# Patient Record
Sex: FEMALE | Race: WHITE | NOT HISPANIC OR LATINO
[De-identification: names, ages, dates, MRNs, and addresses within clinical notes are randomized per-mention and may not be internally consistent; named-entity substitution may affect disease eponyms.]

---

## 2017-01-11 ENCOUNTER — RX RENEWAL (OUTPATIENT)
Age: 57
End: 2017-01-11

## 2017-08-28 ENCOUNTER — TRANSCRIPTION ENCOUNTER (OUTPATIENT)
Age: 57
End: 2017-08-28

## 2017-09-08 ENCOUNTER — OTHER (OUTPATIENT)
Age: 57
End: 2017-09-08

## 2017-09-10 ENCOUNTER — TRANSCRIPTION ENCOUNTER (OUTPATIENT)
Age: 57
End: 2017-09-10

## 2017-09-21 ENCOUNTER — TRANSCRIPTION ENCOUNTER (OUTPATIENT)
Age: 57
End: 2017-09-21

## 2017-11-28 ENCOUNTER — APPOINTMENT (OUTPATIENT)
Dept: INTERNAL MEDICINE | Facility: CLINIC | Age: 57
End: 2017-11-28
Payer: COMMERCIAL

## 2017-11-28 ENCOUNTER — MED ADMIN CHARGE (OUTPATIENT)
Age: 57
End: 2017-11-28

## 2017-11-28 PROCEDURE — 90686 IIV4 VACC NO PRSV 0.5 ML IM: CPT

## 2017-11-28 PROCEDURE — G0008: CPT

## 2017-12-06 ENCOUNTER — APPOINTMENT (OUTPATIENT)
Dept: DERMATOLOGY | Facility: CLINIC | Age: 57
End: 2017-12-06
Payer: COMMERCIAL

## 2017-12-06 PROCEDURE — 99213 OFFICE O/P EST LOW 20 MIN: CPT

## 2017-12-07 ENCOUNTER — NON-APPOINTMENT (OUTPATIENT)
Age: 57
End: 2017-12-07

## 2017-12-07 ENCOUNTER — APPOINTMENT (OUTPATIENT)
Dept: INTERNAL MEDICINE | Facility: CLINIC | Age: 57
End: 2017-12-07
Payer: COMMERCIAL

## 2017-12-07 VITALS
RESPIRATION RATE: 11 BRPM | BODY MASS INDEX: 25.21 KG/M2 | SYSTOLIC BLOOD PRESSURE: 122 MMHG | HEART RATE: 64 BPM | WEIGHT: 137 LBS | HEIGHT: 62 IN | DIASTOLIC BLOOD PRESSURE: 80 MMHG

## 2017-12-07 DIAGNOSIS — Z92.29 PERSONAL HISTORY OF OTHER DRUG THERAPY: ICD-10-CM

## 2017-12-07 DIAGNOSIS — Z15.01 GENETIC SUSCEPTIBILITY TO MALIGNANT NEOPLASM OF BREAST: ICD-10-CM

## 2017-12-07 DIAGNOSIS — Z15.02 GENETIC SUSCEPTIBILITY TO MALIGNANT NEOPLASM OF BREAST: ICD-10-CM

## 2017-12-07 PROCEDURE — 93000 ELECTROCARDIOGRAM COMPLETE: CPT

## 2017-12-07 PROCEDURE — 36415 COLL VENOUS BLD VENIPUNCTURE: CPT

## 2017-12-07 PROCEDURE — 99396 PREV VISIT EST AGE 40-64: CPT | Mod: 25

## 2017-12-08 ENCOUNTER — RESULT REVIEW (OUTPATIENT)
Age: 57
End: 2017-12-08

## 2017-12-08 LAB
ALBUMIN SERPL ELPH-MCNC: 4.3 G/DL
ALP BLD-CCNC: 59 U/L
ALT SERPL-CCNC: 25 U/L
ANION GAP SERPL CALC-SCNC: 15 MMOL/L
APPEARANCE: CLEAR
AST SERPL-CCNC: 30 U/L
BACTERIA: NEGATIVE
BASOPHILS # BLD AUTO: 0.03 K/UL
BASOPHILS NFR BLD AUTO: 0.8 %
BILIRUB SERPL-MCNC: 0.3 MG/DL
BILIRUBIN URINE: NEGATIVE
BLOOD URINE: NEGATIVE
BUN SERPL-MCNC: 14 MG/DL
CALCIUM SERPL-MCNC: 9.6 MG/DL
CHLORIDE SERPL-SCNC: 103 MMOL/L
CHOLEST SERPL-MCNC: 236 MG/DL
CHOLEST/HDLC SERPL: 2.9 RATIO
CO2 SERPL-SCNC: 23 MMOL/L
COLOR: YELLOW
CREAT SERPL-MCNC: 0.82 MG/DL
EOSINOPHIL # BLD AUTO: 0.2 K/UL
EOSINOPHIL NFR BLD AUTO: 5.1 %
GLUCOSE QUALITATIVE U: NEGATIVE MG/DL
GLUCOSE SERPL-MCNC: 94 MG/DL
HCT VFR BLD CALC: 38.9 %
HDLC SERPL-MCNC: 82 MG/DL
HGB BLD-MCNC: 12.9 G/DL
HYALINE CASTS: 0 /LPF
IMM GRANULOCYTES NFR BLD AUTO: 0.3 %
KETONES URINE: NEGATIVE
LDLC SERPL CALC-MCNC: 134 MG/DL
LEUKOCYTE ESTERASE URINE: ABNORMAL
LYMPHOCYTES # BLD AUTO: 1.42 K/UL
LYMPHOCYTES NFR BLD AUTO: 36.2 %
MAN DIFF?: NORMAL
MCHC RBC-ENTMCNC: 30.5 PG
MCHC RBC-ENTMCNC: 33.2 GM/DL
MCV RBC AUTO: 92 FL
MICROSCOPIC-UA: NORMAL
MONOCYTES # BLD AUTO: 0.34 K/UL
MONOCYTES NFR BLD AUTO: 8.7 %
NEUTROPHILS # BLD AUTO: 1.92 K/UL
NEUTROPHILS NFR BLD AUTO: 48.9 %
NITRITE URINE: NEGATIVE
PH URINE: 6.5
PLATELET # BLD AUTO: 197 K/UL
POTASSIUM SERPL-SCNC: 4.6 MMOL/L
PROT SERPL-MCNC: 7.4 G/DL
PROTEIN URINE: NEGATIVE MG/DL
RBC # BLD: 4.23 M/UL
RBC # FLD: 12.8 %
RED BLOOD CELLS URINE: 2 /HPF
SODIUM SERPL-SCNC: 141 MMOL/L
SPECIFIC GRAVITY URINE: 1
SQUAMOUS EPITHELIAL CELLS: 1 /HPF
TRIGL SERPL-MCNC: 98 MG/DL
UROBILINOGEN URINE: NEGATIVE MG/DL
WBC # FLD AUTO: 3.92 K/UL
WHITE BLOOD CELLS URINE: 1 /HPF

## 2018-03-29 ENCOUNTER — OUTPATIENT (OUTPATIENT)
Dept: OUTPATIENT SERVICES | Facility: HOSPITAL | Age: 58
LOS: 1 days | End: 2018-03-29
Payer: COMMERCIAL

## 2018-03-29 VITALS
HEART RATE: 60 BPM | DIASTOLIC BLOOD PRESSURE: 78 MMHG | OXYGEN SATURATION: 100 % | HEIGHT: 63 IN | RESPIRATION RATE: 16 BRPM | SYSTOLIC BLOOD PRESSURE: 143 MMHG | TEMPERATURE: 98 F | WEIGHT: 130.95 LBS

## 2018-03-29 DIAGNOSIS — N60.02 SOLITARY CYST OF LEFT BREAST: Chronic | ICD-10-CM

## 2018-03-29 DIAGNOSIS — Z90.12 ACQUIRED ABSENCE OF LEFT BREAST AND NIPPLE: Chronic | ICD-10-CM

## 2018-03-29 DIAGNOSIS — Z01.818 ENCOUNTER FOR OTHER PREPROCEDURAL EXAMINATION: ICD-10-CM

## 2018-03-29 DIAGNOSIS — M67.40 GANGLION, UNSPECIFIED SITE: ICD-10-CM

## 2018-03-29 DIAGNOSIS — Z98.890 OTHER SPECIFIED POSTPROCEDURAL STATES: Chronic | ICD-10-CM

## 2018-03-29 DIAGNOSIS — M77.32 CALCANEAL SPUR, LEFT FOOT: ICD-10-CM

## 2018-03-29 DIAGNOSIS — M77.52 OTHER ENTHESOPATHY OF LEFT FOOT AND ANKLE: ICD-10-CM

## 2018-03-29 DIAGNOSIS — C50.912 MALIGNANT NEOPLASM OF UNSPECIFIED SITE OF LEFT FEMALE BREAST: ICD-10-CM

## 2018-03-29 DIAGNOSIS — M79.2 NEURALGIA AND NEURITIS, UNSPECIFIED: ICD-10-CM

## 2018-03-29 DIAGNOSIS — M67.472 GANGLION, LEFT ANKLE AND FOOT: ICD-10-CM

## 2018-03-29 LAB
ANION GAP SERPL CALC-SCNC: 5 MMOL/L — SIGNIFICANT CHANGE UP (ref 5–17)
BUN SERPL-MCNC: 26 MG/DL — HIGH (ref 7–23)
CALCIUM SERPL-MCNC: 9.9 MG/DL — SIGNIFICANT CHANGE UP (ref 8.4–10.5)
CHLORIDE SERPL-SCNC: 102 MMOL/L — SIGNIFICANT CHANGE UP (ref 96–108)
CO2 SERPL-SCNC: 29 MMOL/L — SIGNIFICANT CHANGE UP (ref 22–31)
CREAT SERPL-MCNC: 0.77 MG/DL — SIGNIFICANT CHANGE UP (ref 0.5–1.3)
GLUCOSE SERPL-MCNC: 105 MG/DL — HIGH (ref 70–99)
HCT VFR BLD CALC: 40.4 % — SIGNIFICANT CHANGE UP (ref 34.5–45)
HGB BLD-MCNC: 14.6 G/DL — SIGNIFICANT CHANGE UP (ref 11.5–15.5)
MCHC RBC-ENTMCNC: 32.9 PG — SIGNIFICANT CHANGE UP (ref 27–34)
MCHC RBC-ENTMCNC: 36 GM/DL — SIGNIFICANT CHANGE UP (ref 32–36)
MCV RBC AUTO: 91.4 FL — SIGNIFICANT CHANGE UP (ref 80–100)
PLATELET # BLD AUTO: 191 K/UL — SIGNIFICANT CHANGE UP (ref 150–400)
POTASSIUM SERPL-MCNC: 4.1 MMOL/L — SIGNIFICANT CHANGE UP (ref 3.5–5.3)
POTASSIUM SERPL-SCNC: 4.1 MMOL/L — SIGNIFICANT CHANGE UP (ref 3.5–5.3)
RBC # BLD: 4.42 M/UL — SIGNIFICANT CHANGE UP (ref 3.8–5.2)
RBC # FLD: 11.2 % — SIGNIFICANT CHANGE UP (ref 10.3–14.5)
SODIUM SERPL-SCNC: 136 MMOL/L — SIGNIFICANT CHANGE UP (ref 135–145)
WBC # BLD: 5 K/UL — SIGNIFICANT CHANGE UP (ref 3.8–10.5)
WBC # FLD AUTO: 5 K/UL — SIGNIFICANT CHANGE UP (ref 3.8–10.5)

## 2018-03-29 PROCEDURE — 93005 ELECTROCARDIOGRAM TRACING: CPT

## 2018-03-29 PROCEDURE — 85027 COMPLETE CBC AUTOMATED: CPT

## 2018-03-29 PROCEDURE — G0463: CPT

## 2018-03-29 PROCEDURE — 93010 ELECTROCARDIOGRAM REPORT: CPT | Mod: NC

## 2018-03-29 PROCEDURE — 80048 BASIC METABOLIC PNL TOTAL CA: CPT

## 2018-03-29 NOTE — H&P PST ADULT - FAMILY HISTORY
Father  Still living? No  Family history of myocardial infarction, Age at diagnosis: Age Unknown  Family history of breast cancer in male, Age at diagnosis: Age Unknown     Mother  Still living? No  Family history of breast cancer in mother, Age at diagnosis: Age Unknown  Maternal family history of dementia, Age at diagnosis: Age Unknown  Family history of osteoporosis in mother, Age at diagnosis: Age Unknown     Sibling  Still living? Yes, Estimated age: 51-60  Family history of breast cancer in sister, Age at diagnosis: Age Unknown  Family history of breast cancer in sister, Age at diagnosis: Age Unknown

## 2018-03-29 NOTE — H&P PST ADULT - MUSCULOSKELETAL COMMENTS
left foot intermittent pain- radiates to left toes, right knee pain dorsal surface of left foot with centrally located approx 2cm annular firm (but compressible) fixed mass- non-tender on palpation

## 2018-03-29 NOTE — H&P PST ADULT - PSH
Cyst of left breast  approx 1995- excision left breast cyst  S/P colonoscopy  approx 2004  S/P mastectomy, left  2006

## 2018-03-29 NOTE — H&P PST ADULT - ASSESSMENT
56yo female patient scheduled for surgery on 4/17/18. She will obtain medical clearance from her PMD. She will be NPO as per Anesthesia and will take Pepcid @  on 4/16 and on AM of surgery with a sip of water. All other pre-op instructions reviewed with patient.

## 2018-03-29 NOTE — H&P PST ADULT - ATTENDING COMMENTS
Patient seen and examined at bedside. Denies any symptoms, complaints, no chest pain, palpitation, sob or any other complaints. Patient was cleared as out patient by her PCP Dr. Hansen . Medically stable. Scheduled to have a cyst removed from dorsal aspect of left foot.

## 2018-03-29 NOTE — H&P PST ADULT - NSANTHOSAYNRD_GEN_A_CORE
No. FIDELINA screening performed.  STOP BANG Legend: 0-2 = LOW Risk; 3-4 = INTERMEDIATE Risk; 5-8 = HIGH Risk

## 2018-03-29 NOTE — H&P PST ADULT - HISTORY OF PRESENT ILLNESS
58yo female patient with approximately a year history pain in her left foot. She states that it was worse this past winter due to wearing boots and shoes. She states that she has had the cyst drained once but it recurred. She rates that pain at 6-7/10 when it does occur, and the pain radiates to her toes. She was told that surgical excision of cyst and bone spur is recommended and she presents today for PSTs.

## 2018-03-29 NOTE — H&P PST ADULT - PMH
Bone spur of left foot    Ganglion cyst of left foot    Malignant neoplasm of left female breast, unspecified estrogen receptor status, unspecified site of breast    Motion sickness    Osteoporosis    Psoriasis

## 2018-04-12 ENCOUNTER — APPOINTMENT (OUTPATIENT)
Dept: INTERNAL MEDICINE | Facility: CLINIC | Age: 58
End: 2018-04-12
Payer: COMMERCIAL

## 2018-04-12 VITALS
SYSTOLIC BLOOD PRESSURE: 122 MMHG | RESPIRATION RATE: 14 BRPM | DIASTOLIC BLOOD PRESSURE: 80 MMHG | HEART RATE: 83 BPM | BODY MASS INDEX: 23.92 KG/M2 | HEIGHT: 62 IN | WEIGHT: 130 LBS

## 2018-04-12 PROCEDURE — 99213 OFFICE O/P EST LOW 20 MIN: CPT

## 2018-04-16 ENCOUNTER — TRANSCRIPTION ENCOUNTER (OUTPATIENT)
Age: 58
End: 2018-04-16

## 2018-04-16 RX ORDER — FAMOTIDINE 10 MG/ML
1 INJECTION INTRAVENOUS
Qty: 0 | Refills: 0 | COMMUNITY
Start: 2018-04-16 | End: 2018-04-17

## 2018-04-17 ENCOUNTER — RESULT REVIEW (OUTPATIENT)
Age: 58
End: 2018-04-17

## 2018-04-17 ENCOUNTER — OUTPATIENT (OUTPATIENT)
Dept: OUTPATIENT SERVICES | Facility: HOSPITAL | Age: 58
LOS: 1 days | End: 2018-04-17
Payer: COMMERCIAL

## 2018-04-17 VITALS
RESPIRATION RATE: 14 BRPM | SYSTOLIC BLOOD PRESSURE: 126 MMHG | HEIGHT: 63 IN | OXYGEN SATURATION: 100 % | WEIGHT: 130.29 LBS | DIASTOLIC BLOOD PRESSURE: 81 MMHG | HEART RATE: 59 BPM | TEMPERATURE: 98 F

## 2018-04-17 VITALS
OXYGEN SATURATION: 99 % | RESPIRATION RATE: 12 BRPM | DIASTOLIC BLOOD PRESSURE: 79 MMHG | SYSTOLIC BLOOD PRESSURE: 135 MMHG | HEART RATE: 67 BPM

## 2018-04-17 DIAGNOSIS — N60.02 SOLITARY CYST OF LEFT BREAST: Chronic | ICD-10-CM

## 2018-04-17 DIAGNOSIS — Z90.12 ACQUIRED ABSENCE OF LEFT BREAST AND NIPPLE: Chronic | ICD-10-CM

## 2018-04-17 DIAGNOSIS — M67.40 GANGLION, UNSPECIFIED SITE: ICD-10-CM

## 2018-04-17 DIAGNOSIS — M67.472 GANGLION, LEFT ANKLE AND FOOT: ICD-10-CM

## 2018-04-17 DIAGNOSIS — Z98.890 OTHER SPECIFIED POSTPROCEDURAL STATES: Chronic | ICD-10-CM

## 2018-04-17 DIAGNOSIS — M79.2 NEURALGIA AND NEURITIS, UNSPECIFIED: ICD-10-CM

## 2018-04-17 DIAGNOSIS — M77.32 CALCANEAL SPUR, LEFT FOOT: ICD-10-CM

## 2018-04-17 PROCEDURE — 99243 OFF/OP CNSLTJ NEW/EST LOW 30: CPT

## 2018-04-17 PROCEDURE — 73630 X-RAY EXAM OF FOOT: CPT

## 2018-04-17 PROCEDURE — 88311 DECALCIFY TISSUE: CPT | Mod: 26

## 2018-04-17 PROCEDURE — 97161 PT EVAL LOW COMPLEX 20 MIN: CPT | Mod: CI

## 2018-04-17 PROCEDURE — 88311 DECALCIFY TISSUE: CPT

## 2018-04-17 PROCEDURE — 73630 X-RAY EXAM OF FOOT: CPT | Mod: 26,LT

## 2018-04-17 PROCEDURE — 28104 REMOVAL OF FOOT LESION: CPT | Mod: LT

## 2018-04-17 PROCEDURE — 88305 TISSUE EXAM BY PATHOLOGIST: CPT | Mod: 26

## 2018-04-17 PROCEDURE — 88305 TISSUE EXAM BY PATHOLOGIST: CPT

## 2018-04-17 RX ORDER — OXYCODONE AND ACETAMINOPHEN 5; 325 MG/1; MG/1
1 TABLET ORAL ONCE
Qty: 0 | Refills: 0 | Status: DISCONTINUED | OUTPATIENT
Start: 2018-04-17 | End: 2018-04-17

## 2018-04-17 RX ORDER — SODIUM CHLORIDE 9 MG/ML
1000 INJECTION, SOLUTION INTRAVENOUS
Qty: 0 | Refills: 0 | Status: DISCONTINUED | OUTPATIENT
Start: 2018-04-17 | End: 2018-04-18

## 2018-04-17 RX ORDER — HYDROMORPHONE HYDROCHLORIDE 2 MG/ML
0.5 INJECTION INTRAMUSCULAR; INTRAVENOUS; SUBCUTANEOUS
Qty: 0 | Refills: 0 | Status: DISCONTINUED | OUTPATIENT
Start: 2018-04-17 | End: 2018-04-18

## 2018-04-17 RX ORDER — CEFAZOLIN SODIUM 1 G
2000 VIAL (EA) INJECTION ONCE
Qty: 0 | Refills: 0 | Status: COMPLETED | OUTPATIENT
Start: 2018-04-17 | End: 2018-04-17

## 2018-04-17 RX ADMIN — SODIUM CHLORIDE 100 MILLILITER(S): 9 INJECTION, SOLUTION INTRAVENOUS at 13:03

## 2018-04-17 NOTE — BRIEF OPERATIVE NOTE - POST-OP DX
Exostosis of bone of foot  04/17/2018    Rob Armenta  Ganglion cyst  04/17/2018    Rob Armenta  Neuritis of foot, left  04/17/2018    Rob Armenta

## 2018-04-17 NOTE — ASU DISCHARGE PLAN (ADULT/PEDIATRIC). - MEDICATION SUMMARY - MEDICATIONS TO TAKE
I will START or STAY ON the medications listed below when I get home from the hospital:    Pepcid AC Maximum Strength 20 mg oral tablet  -- 1 tab(s) by mouth 2 times a day(x2 preoperatively )  -- Indication: For Ganglion cyst

## 2018-04-17 NOTE — BRIEF OPERATIVE NOTE - PRE-OP DX
Exostosis of left foot  04/17/2018    Rob Armenta  Ganglion cyst  04/17/2018    Rob Armenta  Neuritis of left foot  04/17/2018    Rob Armenta

## 2018-04-17 NOTE — BRIEF OPERATIVE NOTE - PROCEDURE
<<-----Click on this checkbox to enter Procedure Neurolysis of lower extremity  04/17/2018    Active  [facility]  Exostectomy of bone of foot  04/17/2018    Active  MLIVINGSTON  Excision of ganglion cyst  04/17/2018    Active  MLIVINGSTON

## 2018-04-17 NOTE — ASU DISCHARGE PLAN (ADULT/PEDIATRIC). - NOTIFY
Numbness, tingling/Persistent Nausea and Vomiting/Increased Irritability or Sluggishness/Bleeding that does not stop/Swelling that continues/Numbness, color, or temperature change to extremity/Fever greater than 101/Excessive Diarrhea/Inability to Tolerate Liquids or Foods/Pain not relieved by Medications

## 2018-10-24 PROBLEM — T75.3XXA MOTION SICKNESS, INITIAL ENCOUNTER: Chronic | Status: ACTIVE | Noted: 2018-03-29

## 2018-10-24 PROBLEM — M67.472 GANGLION, LEFT ANKLE AND FOOT: Chronic | Status: ACTIVE | Noted: 2018-03-29

## 2018-10-24 PROBLEM — L40.9 PSORIASIS, UNSPECIFIED: Chronic | Status: ACTIVE | Noted: 2018-03-29

## 2018-10-24 PROBLEM — C50.912 MALIGNANT NEOPLASM OF UNSPECIFIED SITE OF LEFT FEMALE BREAST: Chronic | Status: ACTIVE | Noted: 2018-03-29

## 2018-10-24 PROBLEM — M77.52 OTHER ENTHESOPATHY OF LEFT FOOT AND ANKLE: Chronic | Status: ACTIVE | Noted: 2018-03-29

## 2018-10-24 PROBLEM — M81.0 AGE-RELATED OSTEOPOROSIS WITHOUT CURRENT PATHOLOGICAL FRACTURE: Chronic | Status: ACTIVE | Noted: 2018-03-29

## 2018-10-25 ENCOUNTER — APPOINTMENT (OUTPATIENT)
Dept: DERMATOLOGY | Facility: CLINIC | Age: 58
End: 2018-10-25
Payer: COMMERCIAL

## 2018-10-25 PROCEDURE — 99213 OFFICE O/P EST LOW 20 MIN: CPT | Mod: 25

## 2018-10-25 PROCEDURE — 17110 DESTRUCTION B9 LES UP TO 14: CPT

## 2018-12-06 ENCOUNTER — APPOINTMENT (OUTPATIENT)
Dept: INTERNAL MEDICINE | Facility: CLINIC | Age: 58
End: 2018-12-06

## 2018-12-13 ENCOUNTER — APPOINTMENT (OUTPATIENT)
Dept: INTERNAL MEDICINE | Facility: CLINIC | Age: 58
End: 2018-12-13
Payer: COMMERCIAL

## 2018-12-13 ENCOUNTER — NON-APPOINTMENT (OUTPATIENT)
Age: 58
End: 2018-12-13

## 2018-12-13 VITALS
BODY MASS INDEX: 25.76 KG/M2 | RESPIRATION RATE: 14 BRPM | WEIGHT: 140 LBS | HEART RATE: 72 BPM | HEIGHT: 62 IN | DIASTOLIC BLOOD PRESSURE: 82 MMHG | SYSTOLIC BLOOD PRESSURE: 130 MMHG

## 2018-12-13 DIAGNOSIS — S82.044D NONDISPLACED COMMINUTED FRACTURE OF RIGHT PATELLA, SUBSEQUENT ENCOUNTER FOR CLOSED FRACTURE WITH ROUTINE HEALING: ICD-10-CM

## 2018-12-13 DIAGNOSIS — Z01.818 ENCOUNTER FOR OTHER PREPROCEDURAL EXAMINATION: ICD-10-CM

## 2018-12-13 DIAGNOSIS — M54.12 RADICULOPATHY, CERVICAL REGION: ICD-10-CM

## 2018-12-13 DIAGNOSIS — S82.044A NONDISPLACED COMMINUTED FRACTURE OF RIGHT PATELLA, INITIAL ENCOUNTER FOR CLOSED FRACTURE: ICD-10-CM

## 2018-12-13 PROCEDURE — 93000 ELECTROCARDIOGRAM COMPLETE: CPT

## 2018-12-13 PROCEDURE — 36415 COLL VENOUS BLD VENIPUNCTURE: CPT

## 2018-12-13 PROCEDURE — 99396 PREV VISIT EST AGE 40-64: CPT | Mod: 25

## 2018-12-13 NOTE — HISTORY OF PRESENT ILLNESS
[FreeTextEntry1] : 58-year-old female without any chronic medications and no chronic medical issues presents for her yearly physical.\par \par Patient's main history is that she had left breast cancer in 2006 with ultimate left mastectomy with strong family history of breast cancer and patient being BRCA+\par Patient also had prophylactic  bilateral salpingo-oophorectomy\par She was on tamoxifen for 5 years from 2008 to 2013 and now follows with oncology at [facility] [place].\par She is surveyed  every 6 months with alternating mammographies and MRIs.\par \par Patient otherwise with no significant medical history\par Patient generally feels well without complaints including no chest pain, palpitations, shortness of breath or edema\par \par The patient's only complaint is that she has a URI for the past few days, which is improving, but has not resolved as yet.\par

## 2018-12-13 NOTE — PHYSICAL EXAM
[General Appearance - Alert] : alert [General Appearance - In No Acute Distress] : in no acute distress [Sclera] : the sclera and conjunctiva were normal [PERRL With Normal Accommodation] : pupils were equal in size, round, and reactive to light [Extraocular Movements] : extraocular movements were intact [Outer Ear] : the ears and nose were normal in appearance [Oropharynx] : the oropharynx was normal [Neck Appearance] : the appearance of the neck was normal [Neck Cervical Mass (___cm)] : no neck mass was observed [Jugular Venous Distention Increased] : there was no jugular-venous distention [Thyroid Diffuse Enlargement] : the thyroid was not enlarged [Thyroid Nodule] : there were no palpable thyroid nodules [Auscultation Breath Sounds / Voice Sounds] : lungs were clear to auscultation bilaterally [Heart Rate And Rhythm] : heart rate was normal and rhythm regular [Heart Sounds] : normal S1 and S2 [Heart Sounds Gallop] : no gallops [Murmurs] : no murmurs [Heart Sounds Pericardial Friction Rub] : no pericardial rub [Arterial Pulses Carotid] : carotid pulses were normal with no bruits [Abdominal Aorta] : the abdominal aorta was normal [Arterial Pulses Femoral] : femoral pulses were normal without bruits [Full Pulse] : the pedal pulses are present [Edema] : there was no peripheral edema [Veins - Varicosity Changes] : there were no varicosital changes [Bowel Sounds] : normal bowel sounds [Abdomen Soft] : soft [Abdomen Tenderness] : non-tender [Abdomen Mass (___ Cm)] : no abdominal mass palpated [Cervical Lymph Nodes Enlarged Posterior Bilaterally] : posterior cervical [Cervical Lymph Nodes Enlarged Anterior Bilaterally] : anterior cervical [Supraclavicular Lymph Nodes Enlarged Bilaterally] : supraclavicular [Axillary Lymph Nodes Enlarged Bilaterally] : axillary [Femoral Lymph Nodes Enlarged Bilaterally] : femoral [Inguinal Lymph Nodes Enlarged Bilaterally] : inguinal [No Spinal Tenderness] : no spinal tenderness [Abnormal Walk] : normal gait [Nail Clubbing] : no clubbing  or cyanosis of the fingernails [Musculoskeletal - Swelling] : no joint swelling seen [Motor Tone] : muscle strength and tone were normal [Skin Color & Pigmentation] : normal skin color and pigmentation [Skin Turgor] : normal skin turgor [] : no rash [Cranial Nerves] : cranial nerves 2-12 were intact [No Focal Deficits] : no focal deficits [Oriented To Time, Place, And Person] : oriented to person, place, and time [Impaired Insight] : insight and judgment were intact [Affect] : the affect was normal [FreeTextEntry1] : Positive right knee brace [Over the Past 2 Weeks, Have You Felt Down, Depressed, or Hopeless?] : 1.) Over the past 2 weeks, have you felt down, depressed, or hopeless? No [Over the Past 2 Weeks, Have You Felt Little Interest or Pleasure Doing Things?] : 2.) Over the past 2 weeks, have you felt little interest or pleasure doing things? No

## 2018-12-13 NOTE — COUNSELING
[Good understanding] : Patient has a good understanding of lifestyle changes and the steps needed to achieve self management goals [None] : None

## 2018-12-13 NOTE — ASSESSMENT
[FreeTextEntry1] : 57-year-old female with good general medical health currently medically stable with no active medical issues.\par \par Patient with URI, likely viral, which is improving, but not resolved as yet.\par Influenza vaccine will be postponed until patient URI has resolved.\par \par Patient with history of left breast cancer in 2006 with no evidence of disease and patient up-to-date with followups\par Patient encouraged to continue diet and exercise\par \par Colonoscopy 2009 with followup in 5 years therefore patient to contact GI since she feels that they said 10 years. Referral given\par Bone density July 2013\par GYN Dr. Melvin\par Mammography/MRI up-to-date\par \par Followup yearly and as needed

## 2018-12-13 NOTE — HEALTH RISK ASSESSMENT
[No falls in past year] : Patient reported no falls in the past year [0] : 2) Feeling down, depressed, or hopeless: Not at all (0) [None] : None [With Significant Other] : lives with significant other [# of Members in Household ___] :  household currently consist of [unfilled] member(s) [Retired] : retired [Graduate School] : graduate school [] :  [# Of Children ___] : has [unfilled] children [Sexually Active] : sexually active [Feels Safe at Home] : Feels safe at home [Fully functional (bathing, dressing, toileting, transferring, walking, feeding)] : Fully functional (bathing, dressing, toileting, transferring, walking, feeding) [Fully functional (using the telephone, shopping, preparing meals, housekeeping, doing laundry, using] : Fully functional and needs no help or supervision to perform IADLs (using the telephone, shopping, preparing meals, housekeeping, doing laundry, using transportation, managing medications and managing finances) [Smoke Detector] : smoke detector [Carbon Monoxide Detector] : carbon monoxide detector [Seat Belt] :  uses seat belt [Sunscreen] : uses sunscreen [Discussed at today's visit] : Advance Directives Discussed at today's visit [Designated Healthcare Proxy] : Designated healthcare proxy [Very Good] : ~his/her~  mood as very good [Name: ___] : Health Care Proxy's Name: [unfilled]  [] : No [de-identified] : rare [[id number]] : 0 [Patient reported mammogram was normal] : Patient reported mammogram was normal [Patient reported PAP Smear was normal] : Patient reported PAP Smear was normal [Change in mental status noted] : No change in mental status noted [Reports changes in hearing] : Reports no changes in hearing [Reports changes in vision] : Reports no changes in vision [Reports changes in dental health] : Reports no changes in dental health [Guns at Home] : no guns at home [MammogramDate] : 06/18 [PapSmearDate] : 09/18 [HepatitisCDate] : 03/16 [HepatitisCComments] : negative [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

## 2018-12-14 LAB
25(OH)D3 SERPL-MCNC: 28.1 NG/ML
ALBUMIN SERPL ELPH-MCNC: 4.6 G/DL
ALP BLD-CCNC: 62 U/L
ALT SERPL-CCNC: 25 U/L
ANION GAP SERPL CALC-SCNC: 11 MMOL/L
APPEARANCE: CLEAR
AST SERPL-CCNC: 21 U/L
BACTERIA: NEGATIVE
BASOPHILS # BLD AUTO: 0.03 K/UL
BASOPHILS NFR BLD AUTO: 0.5 %
BILIRUB SERPL-MCNC: 0.2 MG/DL
BILIRUBIN URINE: NEGATIVE
BLOOD URINE: ABNORMAL
BUN SERPL-MCNC: 15 MG/DL
CALCIUM SERPL-MCNC: 9.7 MG/DL
CHLORIDE SERPL-SCNC: 105 MMOL/L
CHOLEST SERPL-MCNC: 230 MG/DL
CHOLEST/HDLC SERPL: 4.1 RATIO
CO2 SERPL-SCNC: 25 MMOL/L
COLOR: YELLOW
CREAT SERPL-MCNC: 0.72 MG/DL
EOSINOPHIL # BLD AUTO: 0.15 K/UL
EOSINOPHIL NFR BLD AUTO: 2.4 %
GLUCOSE QUALITATIVE U: NEGATIVE MG/DL
GLUCOSE SERPL-MCNC: 92 MG/DL
HCT VFR BLD CALC: 39.5 %
HDLC SERPL-MCNC: 56 MG/DL
HGB BLD-MCNC: 13.2 G/DL
HYALINE CASTS: 1 /LPF
IMM GRANULOCYTES NFR BLD AUTO: 0.5 %
KETONES URINE: NEGATIVE
LDLC SERPL CALC-MCNC: 133 MG/DL
LEUKOCYTE ESTERASE URINE: NEGATIVE
LYMPHOCYTES # BLD AUTO: 1.68 K/UL
LYMPHOCYTES NFR BLD AUTO: 26.5 %
MAN DIFF?: NORMAL
MCHC RBC-ENTMCNC: 30.5 PG
MCHC RBC-ENTMCNC: 33.4 GM/DL
MCV RBC AUTO: 91.2 FL
MICROSCOPIC-UA: NORMAL
MONOCYTES # BLD AUTO: 0.56 K/UL
MONOCYTES NFR BLD AUTO: 8.8 %
NEUTROPHILS # BLD AUTO: 3.88 K/UL
NEUTROPHILS NFR BLD AUTO: 61.3 %
NITRITE URINE: NEGATIVE
PH URINE: 5
PLATELET # BLD AUTO: 219 K/UL
POTASSIUM SERPL-SCNC: 4.4 MMOL/L
PROT SERPL-MCNC: 7.5 G/DL
PROTEIN URINE: NEGATIVE MG/DL
RBC # BLD: 4.33 M/UL
RBC # FLD: 12.6 %
RED BLOOD CELLS URINE: 2 /HPF
SODIUM SERPL-SCNC: 141 MMOL/L
SPECIFIC GRAVITY URINE: 1.02
SQUAMOUS EPITHELIAL CELLS: 1 /HPF
TRIGL SERPL-MCNC: 207 MG/DL
UROBILINOGEN URINE: NEGATIVE MG/DL
WBC # FLD AUTO: 6.33 K/UL
WHITE BLOOD CELLS URINE: 1 /HPF

## 2018-12-14 RX ORDER — UBIDECARENONE/VIT E ACET 100MG-5
50 MCG CAPSULE ORAL
Qty: 90 | Refills: 1 | Status: ACTIVE | COMMUNITY
Start: 2018-12-14

## 2018-12-19 ENCOUNTER — RESULT REVIEW (OUTPATIENT)
Age: 58
End: 2018-12-19

## 2018-12-21 ENCOUNTER — APPOINTMENT (OUTPATIENT)
Dept: DERMATOLOGY | Facility: CLINIC | Age: 58
End: 2018-12-21
Payer: COMMERCIAL

## 2018-12-21 PROCEDURE — 99214 OFFICE O/P EST MOD 30 MIN: CPT

## 2019-05-03 NOTE — PHYSICAL THERAPY INITIAL EVALUATION ADULT - PATIENT/FAMILY/SIGNIFICANT OTHER GOALS STATEMENT, PT EVAL
burn to R hand  not up to date with tetanus  dermatologist prescribed cream and prednisone; patient states cream made pain worse
pt wants to walk and go home

## 2019-05-22 ENCOUNTER — APPOINTMENT (OUTPATIENT)
Dept: INTERNAL MEDICINE | Facility: CLINIC | Age: 59
End: 2019-05-22
Payer: COMMERCIAL

## 2019-05-22 VITALS
BODY MASS INDEX: 24.84 KG/M2 | SYSTOLIC BLOOD PRESSURE: 132 MMHG | HEIGHT: 62 IN | WEIGHT: 135 LBS | DIASTOLIC BLOOD PRESSURE: 70 MMHG

## 2019-05-22 DIAGNOSIS — Z71.89 OTHER SPECIFIED COUNSELING: ICD-10-CM

## 2019-05-22 PROCEDURE — 90636 HEP A/HEP B VACC ADULT IM: CPT

## 2019-05-22 PROCEDURE — 90691 TYPHOID VACCINE IM: CPT

## 2019-05-22 PROCEDURE — 99204 OFFICE O/P NEW MOD 45 MIN: CPT | Mod: 25

## 2019-05-22 PROCEDURE — 90471 IMMUNIZATION ADMIN: CPT

## 2019-05-22 PROCEDURE — 90472 IMMUNIZATION ADMIN EACH ADD: CPT

## 2019-06-04 ENCOUNTER — APPOINTMENT (OUTPATIENT)
Dept: INFECTIOUS DISEASE | Facility: CLINIC | Age: 59
End: 2019-06-04

## 2019-07-26 ENCOUNTER — APPOINTMENT (OUTPATIENT)
Dept: DERMATOLOGY | Facility: CLINIC | Age: 59
End: 2019-07-26

## 2019-08-01 ENCOUNTER — TRANSCRIPTION ENCOUNTER (OUTPATIENT)
Age: 59
End: 2019-08-01

## 2019-08-09 ENCOUNTER — APPOINTMENT (OUTPATIENT)
Dept: DERMATOLOGY | Facility: CLINIC | Age: 59
End: 2019-08-09
Payer: COMMERCIAL

## 2019-08-09 PROCEDURE — 99214 OFFICE O/P EST MOD 30 MIN: CPT

## 2019-09-15 NOTE — H&P PST ADULT - PROBLEM SELECTOR PROBLEM 3
oriented to person, place and time
Malignant neoplasm of left female breast, unspecified estrogen receptor status, unspecified site of breast

## 2019-11-20 NOTE — H&P PST ADULT - LIVES WITH, PROFILE
Hatchet Flap Text: The defect edges were debeveled with a #15 scalpel blade.  Given the location of the defect, shape of the defect and the proximity to free margins a hatchet flap was deemed most appropriate.  Using a sterile surgical marker, an appropriate hatchet flap was drawn incorporating the defect and placing the expected incisions within the relaxed skin tension lines where possible.    The area thus outlined was incised deep to adipose tissue with a #15 scalpel blade.  The skin margins were undermined to an appropriate distance in all directions utilizing iris scissors. spouse

## 2019-12-23 ENCOUNTER — APPOINTMENT (OUTPATIENT)
Dept: DERMATOLOGY | Facility: CLINIC | Age: 59
End: 2019-12-23
Payer: COMMERCIAL

## 2019-12-23 PROCEDURE — 99213 OFFICE O/P EST LOW 20 MIN: CPT

## 2020-01-02 ENCOUNTER — TRANSCRIPTION ENCOUNTER (OUTPATIENT)
Age: 60
End: 2020-01-02

## 2020-08-07 NOTE — PHYSICAL THERAPY INITIAL EVALUATION ADULT - TRANSFER SKILLS, REHAB EVAL
You may schedule on [place]. The local offices are:    [place]: [address] [place] [address]. [address]  Telephone [phone]  [place]: [address], inside [facility]. Telephone [phone]  [place]: [address]  [address]  Tele independent

## 2020-11-04 ENCOUNTER — NON-APPOINTMENT (OUTPATIENT)
Age: 60
End: 2020-11-04

## 2020-11-04 ENCOUNTER — APPOINTMENT (OUTPATIENT)
Dept: INTERNAL MEDICINE | Facility: CLINIC | Age: 60
End: 2020-11-04
Payer: COMMERCIAL

## 2020-11-04 VITALS
BODY MASS INDEX: 22.26 KG/M2 | HEART RATE: 67 BPM | TEMPERATURE: 97 F | RESPIRATION RATE: 11 BRPM | DIASTOLIC BLOOD PRESSURE: 80 MMHG | WEIGHT: 121 LBS | SYSTOLIC BLOOD PRESSURE: 125 MMHG | HEIGHT: 62 IN

## 2020-11-04 DIAGNOSIS — Z23 ENCOUNTER FOR IMMUNIZATION: ICD-10-CM

## 2020-11-04 DIAGNOSIS — Z11.59 ENCOUNTER FOR SCREENING FOR OTHER VIRAL DISEASES: ICD-10-CM

## 2020-11-04 PROCEDURE — 99072 ADDL SUPL MATRL&STAF TM PHE: CPT

## 2020-11-04 PROCEDURE — 93000 ELECTROCARDIOGRAM COMPLETE: CPT | Mod: 59

## 2020-11-04 PROCEDURE — 36415 COLL VENOUS BLD VENIPUNCTURE: CPT

## 2020-11-04 PROCEDURE — G0442 ANNUAL ALCOHOL SCREEN 15 MIN: CPT | Mod: NC

## 2020-11-04 PROCEDURE — G0008: CPT

## 2020-11-04 PROCEDURE — G0444 DEPRESSION SCREEN ANNUAL: CPT | Mod: NC,59

## 2020-11-04 PROCEDURE — 99396 PREV VISIT EST AGE 40-64: CPT | Mod: 25

## 2020-11-04 PROCEDURE — 90686 IIV4 VACC NO PRSV 0.5 ML IM: CPT

## 2020-11-04 NOTE — ASSESSMENT
[FreeTextEntry1] : 60-year-old female with good general medical health currently medically stable with no active medical issues.\par \par Patient with history of left breast cancer in 2006 with no evidence of disease and patient up-to-date with followups.\par Now status post bilateral mastectomies therefore no further mammographies needed\par \par Patient encouraged to continue diet and exercise\par \par Colonoscopy 2009 with followup in 5 years therefore patient again given referral. Referral given\par Bone density July 2013\par GYN Dr. Melvin\par Mammography/MRI No need for followup\par \par Influenza vaccine given left deltoid\par Shingrix discussed\par Venipuncture done today in the office\par Followup yearly and as needed

## 2020-11-04 NOTE — HISTORY OF PRESENT ILLNESS
[FreeTextEntry1] : 60-year-old female without any chronic medications and no chronic medical issues presents for her yearly physical.\par \par Patient's main history is that she had left breast cancer in 2006 with ultimate left mastectomy with strong family history of breast cancer and patient being BRCA+\par Patient also had prophylactic  bilateral salpingo-oophorectomy\par She was on tamoxifen for 5 years from 2008 to 2013 and now follows with oncology at [facility].\par She is surveyed  every 6 months with alternating mammographies and MRIs.\par Ultimately the patient had a prophylactic right mastectomy March 2019 and is doing well.\par \par Patient otherwise with no significant medical history\par Patient generally feels well without complaints including no chest pain, palpitations, shortness of breath or edema\par \par The patient's  passed away about 4 months ago secondary to chronic heart failure from valvular heart disease.The patient is coping well.\par \par \par

## 2020-11-04 NOTE — HEALTH RISK ASSESSMENT
[Very Good] : ~his/her~  mood as very good [No falls in past year] : Patient reported no falls in the past year [0] : 2) Feeling down, depressed, or hopeless: Not at all (0) [Patient reported mammogram was normal] : Patient reported mammogram was normal [Patient reported PAP Smear was normal] : Patient reported PAP Smear was normal [None] : None [With Significant Other] : lives with significant other [# of Members in Household ___] :  household currently consist of [unfilled] member(s) [Retired] : retired [Graduate School] : graduate school [# Of Children ___] : has [unfilled] children [Sexually Active] : sexually active [Feels Safe at Home] : Feels safe at home [Fully functional (bathing, dressing, toileting, transferring, walking, feeding)] : Fully functional (bathing, dressing, toileting, transferring, walking, feeding) [Fully functional (using the telephone, shopping, preparing meals, housekeeping, doing laundry, using] : Fully functional and needs no help or supervision to perform IADLs (using the telephone, shopping, preparing meals, housekeeping, doing laundry, using transportation, managing medications and managing finances) [Smoke Detector] : smoke detector [Carbon Monoxide Detector] : carbon monoxide detector [Seat Belt] :  uses seat belt [Sunscreen] : uses sunscreen [Discussed at today's visit] : Advance Directives Discussed at today's visit [Designated Healthcare Proxy] : Designated healthcare proxy [Yes] : Yes [Monthly or less (1 pt)] : Monthly or less (1 point) [1 or 2 (0 pts)] : 1 or 2 (0 points) [Never (0 pts)] : Never (0 points) [No] : In the past 12 months have you used drugs other than those required for medical reasons? No [] :  [Reviewed no changes] : Reviewed no changes [Name: ___] : Health Care Proxy's Name: [unfilled]  [] : No [Audit-CScore] : 1 [de-identified] : occ exercise [de-identified] : good [[id number]] : 0 [Change in mental status noted] : No change in mental status noted [Reports changes in hearing] : Reports no changes in hearing [Reports changes in vision] : Reports no changes in vision [Reports changes in dental health] : Reports no changes in dental health [Guns at Home] : no guns at home [MammogramDate] : 06/18 [PapSmearDate] : 09/18 [HepatitisCDate] : 03/16 [HepatitisCComments] : negative [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

## 2020-11-06 ENCOUNTER — NON-APPOINTMENT (OUTPATIENT)
Age: 60
End: 2020-11-06

## 2020-11-06 LAB
ALBUMIN SERPL ELPH-MCNC: 4.7 G/DL
ALP BLD-CCNC: 53 U/L
ALT SERPL-CCNC: 13 U/L
ANION GAP SERPL CALC-SCNC: 12 MMOL/L
APPEARANCE: CLEAR
AST SERPL-CCNC: 22 U/L
BACTERIA: NEGATIVE
BASOPHILS # BLD AUTO: 0.06 K/UL
BASOPHILS NFR BLD AUTO: 1.1 %
BILIRUB SERPL-MCNC: 0.7 MG/DL
BILIRUBIN URINE: NEGATIVE
BLOOD URINE: NEGATIVE
BUN SERPL-MCNC: 12 MG/DL
CALCIUM SERPL-MCNC: 10 MG/DL
CHLORIDE SERPL-SCNC: 100 MMOL/L
CHOLEST SERPL-MCNC: 233 MG/DL
CO2 SERPL-SCNC: 24 MMOL/L
COLOR: COLORLESS
CREAT SERPL-MCNC: 0.82 MG/DL
EOSINOPHIL # BLD AUTO: 0.2 K/UL
EOSINOPHIL NFR BLD AUTO: 3.6 %
GLUCOSE QUALITATIVE U: NEGATIVE
GLUCOSE SERPL-MCNC: 86 MG/DL
HCT VFR BLD CALC: 41.7 %
HDLC SERPL-MCNC: 80 MG/DL
HGB BLD-MCNC: 13.7 G/DL
HYALINE CASTS: 0 /LPF
IMM GRANULOCYTES NFR BLD AUTO: 0.2 %
KETONES URINE: ABNORMAL
LDLC SERPL CALC-MCNC: 134 MG/DL
LEUKOCYTE ESTERASE URINE: NEGATIVE
LYMPHOCYTES # BLD AUTO: 1.89 K/UL
LYMPHOCYTES NFR BLD AUTO: 34 %
MAN DIFF?: NORMAL
MCHC RBC-ENTMCNC: 31.3 PG
MCHC RBC-ENTMCNC: 32.9 GM/DL
MCV RBC AUTO: 95.2 FL
MICROSCOPIC-UA: NORMAL
MONOCYTES # BLD AUTO: 0.59 K/UL
MONOCYTES NFR BLD AUTO: 10.6 %
NEUTROPHILS # BLD AUTO: 2.81 K/UL
NEUTROPHILS NFR BLD AUTO: 50.5 %
NITRITE URINE: NEGATIVE
NONHDLC SERPL-MCNC: 153 MG/DL
PH URINE: 6
PLATELET # BLD AUTO: 205 K/UL
POTASSIUM SERPL-SCNC: 4 MMOL/L
PROT SERPL-MCNC: 7.2 G/DL
PROTEIN URINE: NEGATIVE
RBC # BLD: 4.38 M/UL
RBC # FLD: 12.4 %
RED BLOOD CELLS URINE: 1 /HPF
SARS-COV-2 IGG SERPL IA-ACNC: <0.1 INDEX
SARS-COV-2 IGG SERPL QL IA: NEGATIVE
SODIUM SERPL-SCNC: 137 MMOL/L
SPECIFIC GRAVITY URINE: 1
SQUAMOUS EPITHELIAL CELLS: 1 /HPF
TRIGL SERPL-MCNC: 94 MG/DL
UROBILINOGEN URINE: NORMAL
WBC # FLD AUTO: 5.56 K/UL
WHITE BLOOD CELLS URINE: 2 /HPF

## 2020-11-17 ENCOUNTER — NON-APPOINTMENT (OUTPATIENT)
Age: 60
End: 2020-11-17

## 2020-11-20 ENCOUNTER — TRANSCRIPTION ENCOUNTER (OUTPATIENT)
Age: 60
End: 2020-11-20

## 2020-11-20 ENCOUNTER — NON-APPOINTMENT (OUTPATIENT)
Age: 60
End: 2020-11-20

## 2020-12-20 ENCOUNTER — NON-APPOINTMENT (OUTPATIENT)
Age: 60
End: 2020-12-20

## 2020-12-23 ENCOUNTER — APPOINTMENT (OUTPATIENT)
Dept: DERMATOLOGY | Facility: CLINIC | Age: 60
End: 2020-12-23

## 2021-04-10 NOTE — ASU PATIENT PROFILE, ADULT - VISION (WITH CORRECTIVE LENSES IF THE PATIENT USUALLY WEARS THEM):
wears glasses/Partially impaired: cannot see medication labels or newsprint, but can see obstacles in path, and the surrounding layout; can count fingers at arm's length 10-Apr-2021 06:46

## 2021-04-19 ENCOUNTER — APPOINTMENT (OUTPATIENT)
Dept: DERMATOLOGY | Facility: CLINIC | Age: 61
End: 2021-04-19
Payer: COMMERCIAL

## 2021-04-19 PROCEDURE — 99213 OFFICE O/P EST LOW 20 MIN: CPT

## 2021-04-19 PROCEDURE — 99072 ADDL SUPL MATRL&STAF TM PHE: CPT

## 2021-11-08 ENCOUNTER — NON-APPOINTMENT (OUTPATIENT)
Age: 61
End: 2021-11-08

## 2021-11-08 ENCOUNTER — APPOINTMENT (OUTPATIENT)
Dept: INTERNAL MEDICINE | Facility: CLINIC | Age: 61
End: 2021-11-08
Payer: COMMERCIAL

## 2021-11-08 VITALS
HEIGHT: 62 IN | WEIGHT: 128 LBS | BODY MASS INDEX: 23.55 KG/M2 | SYSTOLIC BLOOD PRESSURE: 120 MMHG | RESPIRATION RATE: 11 BRPM | HEART RATE: 74 BPM | TEMPERATURE: 97.2 F | DIASTOLIC BLOOD PRESSURE: 80 MMHG

## 2021-11-08 DIAGNOSIS — M25.562 PAIN IN RIGHT KNEE: ICD-10-CM

## 2021-11-08 DIAGNOSIS — M25.561 PAIN IN RIGHT KNEE: ICD-10-CM

## 2021-11-08 PROCEDURE — 36415 COLL VENOUS BLD VENIPUNCTURE: CPT

## 2021-11-08 PROCEDURE — 99396 PREV VISIT EST AGE 40-64: CPT | Mod: 25

## 2021-11-08 PROCEDURE — 93000 ELECTROCARDIOGRAM COMPLETE: CPT | Mod: 59

## 2021-11-08 PROCEDURE — G0444 DEPRESSION SCREEN ANNUAL: CPT | Mod: NC,59

## 2021-11-08 RX ORDER — TRAMADOL HYDROCHLORIDE 50 MG/1
50 TABLET, COATED ORAL
Qty: 8 | Refills: 0 | Status: DISCONTINUED | COMMUNITY
Start: 2021-08-04

## 2021-11-08 RX ORDER — CEFADROXIL 1000 MG/1
1 TABLET ORAL
Qty: 10 | Refills: 0 | Status: DISCONTINUED | COMMUNITY
Start: 2021-08-04

## 2021-11-08 RX ORDER — KETOCONAZOLE 20 MG/G
2 CREAM TOPICAL
Qty: 60 | Refills: 0 | Status: DISCONTINUED | COMMUNITY
Start: 2021-05-18

## 2021-11-08 NOTE — HISTORY OF PRESENT ILLNESS
[FreeTextEntry1] : 61-year-old female without any chronic medications and no chronic medical issues presents for her yearly physical.\par \par Patient's main history is that she had left breast cancer in 2006 with ultimate left mastectomy with strong family history of breast cancer and patient being BRCA+\par Patient also had prophylactic  bilateral salpingo-oophorectomy\par She was on tamoxifen for 5 years from 2008 to 2013 and now follows with oncology at [facility].\par She is surveyed  every 6 months with alternating mammographies and MRIs.\par Ultimately the patient had a prophylactic right mastectomy March 2019 and is doing well.\par \par Patient otherwise with no significant medical history\par Patient generally feels well without complaints including no chest pain, palpitations, shortness of breath or edema.\par Her only complaint is increased bilateral knee pain\par \par The patient's  passed away about July 2020 secondary to chronic heart failure from valvular heart disease.The patient is coping well.\par She is a retired teacher and \par \par \par

## 2021-11-08 NOTE — HEALTH RISK ASSESSMENT
[Very Good] : ~his/her~  mood as very good [Yes] : Yes [Monthly or less (1 pt)] : Monthly or less (1 point) [1 or 2 (0 pts)] : 1 or 2 (0 points) [Never (0 pts)] : Never (0 points) [No] : In the past 12 months have you used drugs other than those required for medical reasons? No [No falls in past year] : Patient reported no falls in the past year [0] : 2) Feeling down, depressed, or hopeless: Not at all (0) [Patient reported mammogram was normal] : Patient reported mammogram was normal [Patient reported PAP Smear was normal] : Patient reported PAP Smear was normal [None] : None [Retired] : retired [Graduate School] : graduate school [] :  [# Of Children ___] : has [unfilled] children [Sexually Active] : sexually active [Feels Safe at Home] : Feels safe at home [Fully functional (bathing, dressing, toileting, transferring, walking, feeding)] : Fully functional (bathing, dressing, toileting, transferring, walking, feeding) [Fully functional (using the telephone, shopping, preparing meals, housekeeping, doing laundry, using] : Fully functional and needs no help or supervision to perform IADLs (using the telephone, shopping, preparing meals, housekeeping, doing laundry, using transportation, managing medications and managing finances) [Smoke Detector] : smoke detector [Carbon Monoxide Detector] : carbon monoxide detector [Seat Belt] :  uses seat belt [Sunscreen] : uses sunscreen [Discussed at today's visit] : Advance Directives Discussed at today's visit [Designated Healthcare Proxy] : Designated healthcare proxy [# of Members in Household ___] :  household currently consist of [unfilled] member(s) [Reviewed no changes] : Reviewed, no changes [Name: ___] : Health Care Proxy's Name: [unfilled]  [] : No [Audit-CScore] : 1 [de-identified] : occ exercise [de-identified] : good [[id number]] : 0 [Change in mental status noted] : No change in mental status noted [Reports changes in hearing] : Reports no changes in hearing [Reports changes in vision] : Reports no changes in vision [Reports changes in dental health] : Reports no changes in dental health [Guns at Home] : no guns at home [MammogramDate] : 06/18 [PapSmearDate] : 09/20 [HepatitisCDate] : 03/16 [HepatitisCComments] : negative [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

## 2021-11-08 NOTE — ASSESSMENT
[FreeTextEntry1] : 61-year-old female with good general medical health currently medically stable with no active medical issues.\par \par Patient with history of left breast cancer in 2006 with no evidence of disease and patient up-to-date with followups.\par Now status post bilateral mastectomies therefore no further mammographies needed\par \par Patient encouraged to continue diet and exercise\par \par Increased bilateral knee pain therefore referred to orthopedic\par \par Patient doing quite well mentally after the death of her  July 2020.\par \par Colonoscopy 2009 with followup in 5 years therefore patient again given referral.\par Bone density July 2013\par GYN Dr. Melvin\par Mammography/MRI No need for followup\par \par Influenza and COVID vaccines already received\par Shingrix discussed\par Venipuncture done today in the office\par Followup yearly and as needed

## 2021-11-09 ENCOUNTER — NON-APPOINTMENT (OUTPATIENT)
Age: 61
End: 2021-11-09

## 2021-11-09 LAB
25(OH)D3 SERPL-MCNC: 59.1 NG/ML
ALBUMIN SERPL ELPH-MCNC: 4.5 G/DL
ALP BLD-CCNC: 62 U/L
ALT SERPL-CCNC: 21 U/L
ANION GAP SERPL CALC-SCNC: 11 MMOL/L
APPEARANCE: CLEAR
AST SERPL-CCNC: 22 U/L
BACTERIA: NEGATIVE
BASOPHILS # BLD AUTO: 0.06 K/UL
BASOPHILS NFR BLD AUTO: 0.9 %
BILIRUB SERPL-MCNC: 0.5 MG/DL
BILIRUBIN URINE: NEGATIVE
BLOOD URINE: NORMAL
BUN SERPL-MCNC: 14 MG/DL
CALCIUM SERPL-MCNC: 9.6 MG/DL
CHLORIDE SERPL-SCNC: 104 MMOL/L
CHOLEST SERPL-MCNC: 230 MG/DL
CO2 SERPL-SCNC: 24 MMOL/L
COLOR: YELLOW
CREAT SERPL-MCNC: 0.81 MG/DL
EOSINOPHIL # BLD AUTO: 0.24 K/UL
EOSINOPHIL NFR BLD AUTO: 3.7 %
GLUCOSE QUALITATIVE U: NEGATIVE
GLUCOSE SERPL-MCNC: 91 MG/DL
HCT VFR BLD CALC: 41.7 %
HDLC SERPL-MCNC: 89 MG/DL
HGB BLD-MCNC: 14 G/DL
HYALINE CASTS: 0 /LPF
IMM GRANULOCYTES NFR BLD AUTO: 0.3 %
KETONES URINE: NEGATIVE
LDLC SERPL CALC-MCNC: 123 MG/DL
LEUKOCYTE ESTERASE URINE: ABNORMAL
LYMPHOCYTES # BLD AUTO: 2.19 K/UL
LYMPHOCYTES NFR BLD AUTO: 33.8 %
MAN DIFF?: NORMAL
MCHC RBC-ENTMCNC: 31.1 PG
MCHC RBC-ENTMCNC: 33.6 GM/DL
MCV RBC AUTO: 92.7 FL
MICROSCOPIC-UA: NORMAL
MONOCYTES # BLD AUTO: 0.69 K/UL
MONOCYTES NFR BLD AUTO: 10.6 %
NEUTROPHILS # BLD AUTO: 3.28 K/UL
NEUTROPHILS NFR BLD AUTO: 50.7 %
NITRITE URINE: NEGATIVE
NONHDLC SERPL-MCNC: 141 MG/DL
PH URINE: 5.5
PLATELET # BLD AUTO: 219 K/UL
POTASSIUM SERPL-SCNC: 4.3 MMOL/L
PROT SERPL-MCNC: 7.3 G/DL
PROTEIN URINE: ABNORMAL
RBC # BLD: 4.5 M/UL
RBC # FLD: 12.5 %
RED BLOOD CELLS URINE: 3 /HPF
SODIUM SERPL-SCNC: 139 MMOL/L
SPECIFIC GRAVITY URINE: 1.03
SQUAMOUS EPITHELIAL CELLS: 4 /HPF
TRIGL SERPL-MCNC: 88 MG/DL
URINE COMMENTS: NORMAL
UROBILINOGEN URINE: NORMAL
WBC # FLD AUTO: 6.48 K/UL
WHITE BLOOD CELLS URINE: 4 /HPF

## 2021-12-09 ENCOUNTER — NON-APPOINTMENT (OUTPATIENT)
Age: 61
End: 2021-12-09

## 2021-12-15 DIAGNOSIS — R80.9 PROTEINURIA, UNSPECIFIED: ICD-10-CM

## 2021-12-16 ENCOUNTER — NON-APPOINTMENT (OUTPATIENT)
Age: 61
End: 2021-12-16

## 2021-12-16 LAB
APPEARANCE: CLEAR
BACTERIA: NEGATIVE
BILIRUBIN URINE: NEGATIVE
BLOOD URINE: NEGATIVE
COLOR: NORMAL
GLUCOSE QUALITATIVE U: NEGATIVE
HYALINE CASTS: 1 /LPF
KETONES URINE: NEGATIVE
LEUKOCYTE ESTERASE URINE: ABNORMAL
MICROSCOPIC-UA: NORMAL
NITRITE URINE: NEGATIVE
PH URINE: 7
PROTEIN URINE: NEGATIVE
RED BLOOD CELLS URINE: 2 /HPF
SPECIFIC GRAVITY URINE: 1.02
SQUAMOUS EPITHELIAL CELLS: 4 /HPF
UROBILINOGEN URINE: NORMAL
WHITE BLOOD CELLS URINE: 21 /HPF

## 2022-04-11 PROBLEM — Z11.59 SCREENING FOR VIRAL DISEASE: Status: ACTIVE | Noted: 2020-11-04

## 2022-04-23 ENCOUNTER — NON-APPOINTMENT (OUTPATIENT)
Age: 62
End: 2022-04-23

## 2022-04-25 ENCOUNTER — APPOINTMENT (OUTPATIENT)
Dept: DERMATOLOGY | Facility: CLINIC | Age: 62
End: 2022-04-25
Payer: COMMERCIAL

## 2022-04-25 PROCEDURE — 99213 OFFICE O/P EST LOW 20 MIN: CPT

## 2022-07-08 ENCOUNTER — NON-APPOINTMENT (OUTPATIENT)
Age: 62
End: 2022-07-08

## 2022-07-11 ENCOUNTER — NON-APPOINTMENT (OUTPATIENT)
Age: 62
End: 2022-07-11

## 2022-08-11 NOTE — ASU PATIENT PROFILE, ADULT - NPO AFTER
23:00 [Follow - Up] : a follow-up visit [DM Type 1] : DM Type 1 [Hypothyroidism] : hypothyroidism [Thyroid Cancer] : thyroid cancer

## 2022-10-26 ENCOUNTER — APPOINTMENT (OUTPATIENT)
Dept: DERMATOLOGY | Facility: CLINIC | Age: 62
End: 2022-10-26

## 2022-10-26 PROCEDURE — 99213 OFFICE O/P EST LOW 20 MIN: CPT

## 2022-11-02 ENCOUNTER — APPOINTMENT (OUTPATIENT)
Dept: DERMATOLOGY | Facility: CLINIC | Age: 62
End: 2022-11-02

## 2022-11-02 DIAGNOSIS — Z80.8 FAMILY HISTORY OF MALIGNANT NEOPLASM OF OTHER ORGANS OR SYSTEMS: ICD-10-CM

## 2022-11-02 PROCEDURE — 99214 OFFICE O/P EST MOD 30 MIN: CPT

## 2022-11-02 RX ORDER — COLD-HOT PACK
EACH MISCELLANEOUS
Refills: 0 | Status: ACTIVE | COMMUNITY

## 2022-11-02 RX ORDER — ASCORBIC ACID 500 MG
TABLET ORAL
Refills: 0 | Status: ACTIVE | COMMUNITY

## 2022-11-02 RX ORDER — CIPROFLOXACIN HYDROCHLORIDE 500 MG/1
500 TABLET, FILM COATED ORAL
Qty: 20 | Refills: 0 | Status: DISCONTINUED | COMMUNITY
Start: 2022-07-09

## 2022-11-02 NOTE — HISTORY OF PRESENT ILLNESS
[de-identified] : Pt. referred for eval of AKs on face;  has been recommended 5FU, inquiring re: PDT:

## 2022-11-02 NOTE — ASSESSMENT
[FreeTextEntry1] : AKs; \par \par Therapeutic options and their risks and benefits; along with multiple diagnostic possibilities were discussed at length; risks and benefits of further study were discussed;\par \par Good candidate for PDT; \par Risks and benefits of photodynamic therapy were discussed including redness, pain, and swelling.  \par \par Schedule for treatment;  may need again in future

## 2022-11-22 ENCOUNTER — APPOINTMENT (OUTPATIENT)
Dept: INTERNAL MEDICINE | Facility: CLINIC | Age: 62
End: 2022-11-22

## 2022-11-22 ENCOUNTER — NON-APPOINTMENT (OUTPATIENT)
Age: 62
End: 2022-11-22

## 2022-11-22 VITALS
DIASTOLIC BLOOD PRESSURE: 70 MMHG | SYSTOLIC BLOOD PRESSURE: 120 MMHG | HEART RATE: 78 BPM | RESPIRATION RATE: 11 BRPM | HEIGHT: 62 IN | BODY MASS INDEX: 23.92 KG/M2 | OXYGEN SATURATION: 98 % | WEIGHT: 130 LBS

## 2022-11-22 DIAGNOSIS — Z63.4 DISAPPEARANCE AND DEATH OF FAMILY MEMBER: ICD-10-CM

## 2022-11-22 DIAGNOSIS — Z12.11 ENCOUNTER FOR SCREENING FOR MALIGNANT NEOPLASM OF COLON: ICD-10-CM

## 2022-11-22 DIAGNOSIS — Z13.6 ENCOUNTER FOR SCREENING FOR CARDIOVASCULAR DISORDERS: ICD-10-CM

## 2022-11-22 DIAGNOSIS — Z13.31 ENCOUNTER FOR SCREENING FOR DEPRESSION: ICD-10-CM

## 2022-11-22 LAB — CYTOLOGY CVX/VAG DOC THIN PREP: NORMAL

## 2022-11-22 PROCEDURE — 93000 ELECTROCARDIOGRAM COMPLETE: CPT | Mod: 59

## 2022-11-22 PROCEDURE — 99396 PREV VISIT EST AGE 40-64: CPT | Mod: 25

## 2022-11-22 PROCEDURE — G0444 DEPRESSION SCREEN ANNUAL: CPT | Mod: NC,59

## 2022-11-22 PROCEDURE — 36415 COLL VENOUS BLD VENIPUNCTURE: CPT

## 2022-11-22 RX ORDER — DENOSUMAB 60 MG/ML
60 INJECTION SUBCUTANEOUS
Qty: 1 | Refills: 0 | Status: ACTIVE | COMMUNITY
Start: 2022-11-22

## 2022-11-22 RX ORDER — ATOVAQUONE AND PROGUANIL HYDROCHLORIDE 250; 100 MG/1; MG/1
250-100 TABLET, FILM COATED ORAL DAILY
Qty: 15 | Refills: 0 | Status: DISCONTINUED | COMMUNITY
Start: 2019-06-12 | End: 2022-11-22

## 2022-11-22 SDOH — SOCIAL STABILITY - SOCIAL INSECURITY: DISSAPEARANCE AND DEATH OF FAMILY MEMBER: Z63.4

## 2022-11-27 LAB
25(OH)D3 SERPL-MCNC: 49.7 NG/ML
ALBUMIN SERPL ELPH-MCNC: 4.7 G/DL
ALP BLD-CCNC: 44 U/L
ALT SERPL-CCNC: 19 U/L
ANION GAP SERPL CALC-SCNC: 13 MMOL/L
APPEARANCE: CLEAR
AST SERPL-CCNC: 21 U/L
BACTERIA: NEGATIVE
BASOPHILS # BLD AUTO: 0.05 K/UL
BASOPHILS NFR BLD AUTO: 1.4 %
BILIRUB SERPL-MCNC: 0.5 MG/DL
BILIRUBIN URINE: NEGATIVE
BLOOD URINE: NEGATIVE
BUN SERPL-MCNC: 16 MG/DL
CALCIUM SERPL-MCNC: 9.7 MG/DL
CHLORIDE SERPL-SCNC: 103 MMOL/L
CHOLEST SERPL-MCNC: 233 MG/DL
CO2 SERPL-SCNC: 24 MMOL/L
COLOR: YELLOW
CREAT SERPL-MCNC: 0.85 MG/DL
EGFR: 77 ML/MIN/1.73M2
EOSINOPHIL # BLD AUTO: 0.14 K/UL
EOSINOPHIL NFR BLD AUTO: 3.9 %
ESTIMATED AVERAGE GLUCOSE: 114 MG/DL
GLUCOSE QUALITATIVE U: NEGATIVE
GLUCOSE SERPL-MCNC: 103 MG/DL
HBA1C MFR BLD HPLC: 5.6 %
HCT VFR BLD CALC: 42.8 %
HDLC SERPL-MCNC: 89 MG/DL
HGB BLD-MCNC: 14 G/DL
HYALINE CASTS: 0 /LPF
IMM GRANULOCYTES NFR BLD AUTO: 0 %
KETONES URINE: NEGATIVE
LDLC SERPL CALC-MCNC: 128 MG/DL
LEUKOCYTE ESTERASE URINE: NEGATIVE
LYMPHOCYTES # BLD AUTO: 1.29 K/UL
LYMPHOCYTES NFR BLD AUTO: 36.3 %
MAN DIFF?: NORMAL
MCHC RBC-ENTMCNC: 31.4 PG
MCHC RBC-ENTMCNC: 32.7 GM/DL
MCV RBC AUTO: 96 FL
MICROSCOPIC-UA: NORMAL
MONOCYTES # BLD AUTO: 0.35 K/UL
MONOCYTES NFR BLD AUTO: 9.9 %
NEUTROPHILS # BLD AUTO: 1.72 K/UL
NEUTROPHILS NFR BLD AUTO: 48.5 %
NITRITE URINE: NEGATIVE
NONHDLC SERPL-MCNC: 145 MG/DL
PH URINE: 6
PLATELET # BLD AUTO: 218 K/UL
POTASSIUM SERPL-SCNC: 4 MMOL/L
PROT SERPL-MCNC: 7.4 G/DL
PROTEIN URINE: ABNORMAL
RBC # BLD: 4.46 M/UL
RBC # FLD: 13.2 %
RED BLOOD CELLS URINE: 4 /HPF
SODIUM SERPL-SCNC: 140 MMOL/L
SPECIFIC GRAVITY URINE: 1.03
SQUAMOUS EPITHELIAL CELLS: 3 /HPF
TRIGL SERPL-MCNC: 84 MG/DL
UROBILINOGEN URINE: NORMAL
WBC # FLD AUTO: 3.55 K/UL
WHITE BLOOD CELLS URINE: 3 /HPF

## 2022-11-27 NOTE — HEALTH RISK ASSESSMENT
[Very Good] : ~his/her~  mood as very good [Never] : Never [Yes] : Yes [Monthly or less (1 pt)] : Monthly or less (1 point) [1 or 2 (0 pts)] : 1 or 2 (0 points) [Never (0 pts)] : Never (0 points) [No] : In the past 12 months have you used drugs other than those required for medical reasons? No [No falls in past year] : Patient reported no falls in the past year [0] : 2) Feeling down, depressed, or hopeless: Not at all (0) [Patient reported mammogram was normal] : Patient reported mammogram was normal [Patient reported PAP Smear was normal] : Patient reported PAP Smear was normal [None] : None [# of Members in Household ___] :  household currently consist of [unfilled] member(s) [Retired] : retired [Graduate School] : graduate school [] :  [# Of Children ___] : has [unfilled] children [Sexually Active] : sexually active [Feels Safe at Home] : Feels safe at home [Fully functional (bathing, dressing, toileting, transferring, walking, feeding)] : Fully functional (bathing, dressing, toileting, transferring, walking, feeding) [Fully functional (using the telephone, shopping, preparing meals, housekeeping, doing laundry, using] : Fully functional and needs no help or supervision to perform IADLs (using the telephone, shopping, preparing meals, housekeeping, doing laundry, using transportation, managing medications and managing finances) [Smoke Detector] : smoke detector [Carbon Monoxide Detector] : carbon monoxide detector [Seat Belt] :  uses seat belt [Sunscreen] : uses sunscreen [Reviewed no changes] : Reviewed, no changes [Discussed at today's visit] : Advance Directives Discussed at today's visit [Designated Healthcare Proxy] : Designated healthcare proxy [PHQ-2 Negative - No further assessment needed] : PHQ-2 Negative - No further assessment needed [Alone] : lives alone [Name: ___] : Health Care Proxy's Name: [unfilled]  [Audit-CScore] : 1 [de-identified] : occ exercise [de-identified] : good [EWC2Jpalv] : 0 [Change in mental status noted] : No change in mental status noted [Reports changes in hearing] : Reports no changes in hearing [Reports changes in vision] : Reports no changes in vision [Reports changes in dental health] : Reports no changes in dental health [Guns at Home] : no guns at home [MammogramDate] : 06/18 [MammogramComments] : Status post bilateral mastectomy therefore no longer needed [PapSmearDate] : 07/22 [HepatitisCDate] : 03/16 [HepatitisCComments] : negative [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

## 2022-11-27 NOTE — ASSESSMENT
[FreeTextEntry1] : 62-year-old female with good general medical health currently medically stable with no active medical issues.\par \par Patient with history of left breast cancer in 2006 with no evidence of disease and patient up-to-date with followups.\par Now status post bilateral mastectomies therefore no further mammographies needed\par \par Patient encouraged to continue diet and exercise\par \par Increased bilateral knee pain therefore referred to orthopedic\par \par Osteoporosis followed by rheumatology with patient on Prolia\par \par Patient doing quite well mentally after the death of her  July 2020.\par \par Colonoscopy 2009 with followup in 5 years therefore patient again given referral.  Patient also given stool FIT test\par Bone density April 2022\par GYN Dr. Melvin\par Mammography/MRI No need for followup\par \par Influenza and COVID vaccines already received\par Shingrix discussed\par Venipuncture done today in the office\par Followup yearly and as needed

## 2022-11-27 NOTE — PHYSICAL EXAM
[General Appearance - Alert] : alert [General Appearance - In No Acute Distress] : in no acute distress [Sclera] : the sclera and conjunctiva were normal [PERRL With Normal Accommodation] : pupils were equal in size, round, and reactive to light [Extraocular Movements] : extraocular movements were intact [Outer Ear] : the ears and nose were normal in appearance [Oropharynx] : the oropharynx was normal [Neck Appearance] : the appearance of the neck was normal [Neck Cervical Mass (___cm)] : no neck mass was observed [Jugular Venous Distention Increased] : there was no jugular-venous distention [Thyroid Diffuse Enlargement] : the thyroid was not enlarged [Thyroid Nodule] : there were no palpable thyroid nodules [Auscultation Breath Sounds / Voice Sounds] : lungs were clear to auscultation bilaterally [Heart Rate And Rhythm] : heart rate was normal and rhythm regular [Heart Sounds] : normal S1 and S2 [Heart Sounds Gallop] : no gallops [Murmurs] : no murmurs [Heart Sounds Pericardial Friction Rub] : no pericardial rub [Arterial Pulses Carotid] : carotid pulses were normal with no bruits [Abdominal Aorta] : the abdominal aorta was normal [Arterial Pulses Femoral] : femoral pulses were normal without bruits [Full Pulse] : the pedal pulses are present [Edema] : there was no peripheral edema [Veins - Varicosity Changes] : there were no varicosital changes [Bowel Sounds] : normal bowel sounds [Abdomen Soft] : soft [Abdomen Tenderness] : non-tender [Abdomen Mass (___ Cm)] : no abdominal mass palpated [Cervical Lymph Nodes Enlarged Posterior Bilaterally] : posterior cervical [Cervical Lymph Nodes Enlarged Anterior Bilaterally] : anterior cervical [Supraclavicular Lymph Nodes Enlarged Bilaterally] : supraclavicular [Axillary Lymph Nodes Enlarged Bilaterally] : axillary [Femoral Lymph Nodes Enlarged Bilaterally] : femoral [Inguinal Lymph Nodes Enlarged Bilaterally] : inguinal [No Spinal Tenderness] : no spinal tenderness [Abnormal Walk] : normal gait [Nail Clubbing] : no clubbing  or cyanosis of the fingernails [Musculoskeletal - Swelling] : no joint swelling seen [Motor Tone] : muscle strength and tone were normal [Skin Color & Pigmentation] : normal skin color and pigmentation [Skin Turgor] : normal skin turgor [] : no rash [Cranial Nerves] : cranial nerves 2-12 were intact [No Focal Deficits] : no focal deficits [Oriented To Time, Place, And Person] : oriented to person, place, and time [Affect] : the affect was normal [Impaired Insight] : insight and judgment were intact [FreeTextEntry1] : Positive right knee brace [Over the Past 2 Weeks, Have You Felt Down, Depressed, or Hopeless?] : 1.) Over the past 2 weeks, have you felt down, depressed, or hopeless? No [Over the Past 2 Weeks, Have You Felt Little Interest or Pleasure Doing Things?] : 2.) Over the past 2 weeks, have you felt little interest or pleasure doing things? No

## 2022-11-27 NOTE — ADDENDUM
[FreeTextEntry1] : Labs good other than continued mildly increased cholesterol with good HDL with likely genetic component therefore continue diet and exercise.\par Positive protein in urine therefore increase water daily.\par Mildly decreased white count.\par Follow-up in 1 month to recheck urine analysis and CBC

## 2022-11-27 NOTE — HISTORY OF PRESENT ILLNESS
[FreeTextEntry1] : 62-year-old female without any chronic medications and no chronic medical issues presents for her yearly physical.\par \par Patient's main history is that she had left breast cancer in 2006 with ultimate left mastectomy with strong family history of breast cancer and patient being BRCA+\par Patient also had prophylactic  bilateral salpingo-oophorectomy\par She was on tamoxifen for 5 years from 2008 to 2013 and now follows with oncology at [facility].\par She is surveyed  every 6 months with alternating mammographies and MRIs.\par Ultimately the patient had a prophylactic right mastectomy March 2019 and is doing well.\par \par Patient otherwise with no significant medical history\par Patient generally feels well without complaints including no chest pain, palpitations, shortness of breath or edema.\par Her only complaint is increased bilateral knee pain\par \par She does have osteoporosis for which she follows with rheumatology and now is on Prolia.\par \par The patient's  passed away about July 2020 secondary to chronic heart failure from valvular heart disease.The patient is coping well.\par She is a retired teacher and .\par She lives part of the year in [place]\par \par \par

## 2022-11-28 ENCOUNTER — NON-APPOINTMENT (OUTPATIENT)
Age: 62
End: 2022-11-28

## 2022-12-03 LAB — HEMOCCULT STL QL IA: NEGATIVE

## 2022-12-12 ENCOUNTER — NON-APPOINTMENT (OUTPATIENT)
Age: 62
End: 2022-12-12

## 2022-12-12 ENCOUNTER — OFFICE (OUTPATIENT)
Dept: URBAN - METROPOLITAN AREA CLINIC 115 | Facility: CLINIC | Age: 62
Setting detail: OPHTHALMOLOGY
End: 2022-12-12
Payer: COMMERCIAL

## 2022-12-12 DIAGNOSIS — H02.834: ICD-10-CM

## 2022-12-12 DIAGNOSIS — H01.002: ICD-10-CM

## 2022-12-12 DIAGNOSIS — H10.45: ICD-10-CM

## 2022-12-12 DIAGNOSIS — H43.812: ICD-10-CM

## 2022-12-12 DIAGNOSIS — H01.005: ICD-10-CM

## 2022-12-12 DIAGNOSIS — H25.13: ICD-10-CM

## 2022-12-12 DIAGNOSIS — H52.13: ICD-10-CM

## 2022-12-12 DIAGNOSIS — H35.033: ICD-10-CM

## 2022-12-12 DIAGNOSIS — H04.123: ICD-10-CM

## 2022-12-12 DIAGNOSIS — H43.392: ICD-10-CM

## 2022-12-12 PROCEDURE — 92014 COMPRE OPH EXAM EST PT 1/>: CPT | Performed by: OPHTHALMOLOGY

## 2022-12-12 ASSESSMENT — LID POSITION - DERMATOCHALASIS: OS_DERMATOCHALASIS: LUL T 1+

## 2022-12-12 ASSESSMENT — SPHEQUIV_DERIVED
OD_SPHEQUIV: -0.5
OD_SPHEQUIV: -0.75
OS_SPHEQUIV: -1.625
OS_SPHEQUIV: -1.75
OD_SPHEQUIV: -0.75
OD_SPHEQUIV: -0.625
OS_SPHEQUIV: -1.375
OS_SPHEQUIV: -1.5

## 2022-12-12 ASSESSMENT — REFRACTION_MANIFEST
OU_VA: 20/25
OS_VA1: 20/20
OS_AXIS: 075
OD_CYLINDER: -0.50
OD_AXIS: 095
OS_AXIS: 070
OS_ADD: +2.50
OD_SPHERE: -0.25
OS_SPHERE: -0.25
OD_ADD: +2.50
OD_AXIS: 105
OS_VA1: 20/25
OS_ADD: +2.50
OS_AXIS: 070
OD_ADD: +2.50
OS_CYLINDER: -2.25
OD_CYLINDER: -0.50
OD_AXIS: 105
OD_VA1: 20/20
OS_SPHERE: -0.25
OD_VA1: 20/20
OU_VA: 20/20-1
OS_CYLINDER: -2.50
OD_SPHERE: -0.50
OS_SPHERE: -0.50
OU_VA: 20/20
OD_SPHERE: -0.50
OD_CYLINDER: -0.50
OD_ADD: +2.50
OS_VA1: 20/20-1
OD_VA1: 20/20
OS_CYLINDER: -2.50
OS_ADD: +2.50

## 2022-12-12 ASSESSMENT — SUPERFICIAL PUNCTATE KERATITIS (SPK)
OD_SPK: T
OS_SPK: T

## 2022-12-12 ASSESSMENT — REFRACTION_CURRENTRX
OS_CYLINDER: -2.75
OD_VPRISM_DIRECTION: BF
OD_SPHERE: -0.50
OD_CYLINDER: -0.50
OD_SPHERE: -0.25
OS_OVR_VA: 20/
OS_ADD: +2.50
OD_AXIS: 090
OD_VPRISM_DIRECTION: BF
OS_SPHERE: -0.50
OS_VPRISM_DIRECTION: BF
OS_SPHERE: -0.50
OD_OVR_VA: 20/
OS_ADD: +2.50
OS_VPRISM_DIRECTION: BF
OS_AXIS: 072
OD_AXIS: 100
OD_ADD: +2.50
OD_OVR_VA: 20/
OD_ADD: +2.50
OS_AXIS: 070
OS_OVR_VA: 20/
OD_CYLINDER: -0.50
OS_CYLINDER: -2.50

## 2022-12-12 ASSESSMENT — CONFRONTATIONAL VISUAL FIELD TEST (CVF)
OD_FINDINGS: FULL
OS_FINDINGS: FULL

## 2022-12-12 ASSESSMENT — KERATOMETRY
OD_K1POWER_DIOPTERS: 44.25
OD_K2POWER_DIOPTERS: 44.25
OS_K2POWER_DIOPTERS: 45.01
OS_AXISANGLE_DEGREES: 57
OD_AXISANGLE_DEGREES: 090
OS_K1POWER_DIOPTERS: 43.50

## 2022-12-12 ASSESSMENT — REFRACTION_AUTOREFRACTION
OD_SPHERE: -0.50
OS_CYLINDER: -2.25
OD_CYLINDER: -0.25
OS_SPHERE: -0.50
OS_AXIS: 071
OD_AXIS: 101

## 2022-12-12 ASSESSMENT — LID EXAM ASSESSMENTS
OD_BLEPHARITIS: RLL 1+
OS_BLEPHARITIS: LLL 1+

## 2022-12-12 ASSESSMENT — TONOMETRY
OS_IOP_MMHG: 16
OD_IOP_MMHG: 14

## 2022-12-12 ASSESSMENT — AXIALLENGTH_DERIVED
OD_AL: 23.6086
OS_AL: 24
OD_AL: 23.56
OS_AL: 23.85
OD_AL: 23.5115
OS_AL: 23.95
OS_AL: 23.9
OD_AL: 23.6086

## 2022-12-12 ASSESSMENT — VISUAL ACUITY
OD_BCVA: 20/25
OS_BCVA: 20/20

## 2022-12-28 ENCOUNTER — APPOINTMENT (OUTPATIENT)
Dept: INTERNAL MEDICINE | Facility: CLINIC | Age: 62
End: 2022-12-28

## 2022-12-28 VITALS
WEIGHT: 125 LBS | SYSTOLIC BLOOD PRESSURE: 120 MMHG | BODY MASS INDEX: 23 KG/M2 | OXYGEN SATURATION: 99 % | HEART RATE: 79 BPM | DIASTOLIC BLOOD PRESSURE: 80 MMHG | HEIGHT: 62 IN | RESPIRATION RATE: 12 BRPM

## 2022-12-28 DIAGNOSIS — D72.819 DECREASED WHITE BLOOD CELL COUNT, UNSPECIFIED: ICD-10-CM

## 2022-12-28 DIAGNOSIS — R82.90 UNSPECIFIED ABNORMAL FINDINGS IN URINE: ICD-10-CM

## 2022-12-28 PROCEDURE — 99213 OFFICE O/P EST LOW 20 MIN: CPT | Mod: 25

## 2022-12-28 PROCEDURE — 36415 COLL VENOUS BLD VENIPUNCTURE: CPT

## 2022-12-28 NOTE — ASSESSMENT
[FreeTextEntry1] : Venipuncture done in our office, Labs sent out.Patient advised to continue present medications with diet/exercise and specialists followup. Patient  will return to the office Q year/prn\par \par \par MD's\par 1.Rheum-Dr. Mendes\par 2.Derm-Dr. Giron\par 3.GYN-Dr. Melvin\par 4.Sight MD\par MG=N/A\par BD=4/2022 via rheum\par colonoscopy 09 with next in 5 years "to do"\par FIT Test 11/2022\par Shingrix vaccine d/wpt, +got covid vax X2\par

## 2022-12-28 NOTE — HISTORY OF PRESENT ILLNESS
[FreeTextEntry1] : Followup [de-identified] : Pt presents as followup\par -last labs showed WBC of 3.5 and UA showed protein, abnormal results d/w pt and questions answered\par -feels well

## 2022-12-29 ENCOUNTER — NON-APPOINTMENT (OUTPATIENT)
Age: 62
End: 2022-12-29

## 2022-12-29 LAB
APPEARANCE: CLEAR
BACTERIA: NEGATIVE
BASOPHILS # BLD AUTO: 0.05 K/UL
BASOPHILS NFR BLD AUTO: 0.9 %
BILIRUBIN URINE: NEGATIVE
BLOOD URINE: NEGATIVE
COLOR: YELLOW
EOSINOPHIL # BLD AUTO: 0.13 K/UL
EOSINOPHIL NFR BLD AUTO: 2.3 %
GLUCOSE QUALITATIVE U: NEGATIVE
HCT VFR BLD CALC: 40.3 %
HGB BLD-MCNC: 13.2 G/DL
HYALINE CASTS: 2 /LPF
IMM GRANULOCYTES NFR BLD AUTO: 0.2 %
KETONES URINE: NEGATIVE
LEUKOCYTE ESTERASE URINE: NEGATIVE
LYMPHOCYTES # BLD AUTO: 1.28 K/UL
LYMPHOCYTES NFR BLD AUTO: 22.7 %
MAN DIFF?: NORMAL
MCHC RBC-ENTMCNC: 30.8 PG
MCHC RBC-ENTMCNC: 32.8 GM/DL
MCV RBC AUTO: 93.9 FL
MICROSCOPIC-UA: NORMAL
MONOCYTES # BLD AUTO: 0.47 K/UL
MONOCYTES NFR BLD AUTO: 8.3 %
NEUTROPHILS # BLD AUTO: 3.69 K/UL
NEUTROPHILS NFR BLD AUTO: 65.6 %
NITRITE URINE: NEGATIVE
PH URINE: 5.5
PLATELET # BLD AUTO: 241 K/UL
PROTEIN URINE: NORMAL
RBC # BLD: 4.29 M/UL
RBC # FLD: 13.4 %
RED BLOOD CELLS URINE: 2 /HPF
SPECIFIC GRAVITY URINE: 1.02
SQUAMOUS EPITHELIAL CELLS: 1 /HPF
UROBILINOGEN URINE: NORMAL
WBC # FLD AUTO: 5.63 K/UL
WHITE BLOOD CELLS URINE: 1 /HPF

## 2023-04-20 ENCOUNTER — APPOINTMENT (OUTPATIENT)
Dept: DERMATOLOGY | Facility: CLINIC | Age: 63
End: 2023-04-20

## 2023-05-12 ENCOUNTER — NON-APPOINTMENT (OUTPATIENT)
Age: 63
End: 2023-05-12

## 2023-05-15 ENCOUNTER — APPOINTMENT (OUTPATIENT)
Dept: DERMATOLOGY | Facility: CLINIC | Age: 63
End: 2023-05-15
Payer: COMMERCIAL

## 2023-05-15 DIAGNOSIS — L81.4 OTHER MELANIN HYPERPIGMENTATION: ICD-10-CM

## 2023-05-15 DIAGNOSIS — L98.8 OTHER SPECIFIED DISORDERS OF THE SKIN AND SUBCUTANEOUS TISSUE: ICD-10-CM

## 2023-05-15 PROCEDURE — 96573 PDT DSTR PRMLG LES PHYS/QHP: CPT

## 2023-05-15 PROCEDURE — 99213 OFFICE O/P EST LOW 20 MIN: CPT | Mod: 25

## 2023-05-15 NOTE — ASSESSMENT
[FreeTextEntry1] : PDT today; \par \par Therapeutic options and their risks and benefits; along with multiple diagnostic possibilities were discussed at length; risks and benefits of further study were discussed;\par \par \par Continue regular exams; \par

## 2023-05-15 NOTE — PHYSICAL EXAM
[FreeTextEntry3] : + lentigines and solar damage are present in sun exposed areas;\par \par scaling erythematous papules; diffuse over forehead, temples, nose dorsum;

## 2023-05-16 ENCOUNTER — APPOINTMENT (OUTPATIENT)
Dept: GASTROENTEROLOGY | Facility: CLINIC | Age: 63
End: 2023-05-16

## 2023-08-23 ENCOUNTER — APPOINTMENT (OUTPATIENT)
Dept: DERMATOLOGY | Facility: CLINIC | Age: 63
End: 2023-08-23
Payer: COMMERCIAL

## 2023-08-23 DIAGNOSIS — L57.0 ACTINIC KERATOSIS: ICD-10-CM

## 2023-08-23 DIAGNOSIS — L40.9 PSORIASIS, UNSPECIFIED: ICD-10-CM

## 2023-08-23 PROCEDURE — 17003 DESTRUCT PREMALG LES 2-14: CPT | Mod: 59

## 2023-08-23 PROCEDURE — 99214 OFFICE O/P EST MOD 30 MIN: CPT | Mod: 25

## 2023-08-23 PROCEDURE — 17000 DESTRUCT PREMALG LESION: CPT

## 2023-08-23 RX ORDER — CLOBETASOL PROPIONATE 0.5 MG/G
0.05 AEROSOL, FOAM TOPICAL
Qty: 50 | Refills: 2 | Status: ACTIVE | COMMUNITY
Start: 2023-08-23 | End: 1900-01-01

## 2023-08-23 NOTE — ASSESSMENT
[FreeTextEntry1] : Face did very well with PDT- cryo to 2 residual AKs;  discussed annual TBSE, maintenance    Therapeutic options and their risks and benefits; along with multiple diagnostic possibilities were discussed at length; risks and benefits of further study were discussed;  also:  psoriasis vs. seb derm;  posterior scalp clobetasol foam prn; has used in past

## 2023-08-23 NOTE — HISTORY OF PRESENT ILLNESS
[de-identified] : f/u for check of face; s/p PDT 3 mos ago, did very well with tx; also:  c/o lesion on forehead, scaling, itching on scalp;

## 2023-08-23 NOTE — PHYSICAL EXAM
[FreeTextEntry3] : Face:  minimal residual AKs on upper forehead, temples, cheeks 2 focal AKs L lateral forehead, L temple  Erythematous scaling plaques with adherent scale; posterior scalp

## 2023-09-13 ENCOUNTER — APPOINTMENT (OUTPATIENT)
Dept: INTERNAL MEDICINE | Facility: CLINIC | Age: 63
End: 2023-09-13
Payer: COMMERCIAL

## 2023-09-13 DIAGNOSIS — Z23 ENCOUNTER FOR IMMUNIZATION: ICD-10-CM

## 2023-09-13 PROCEDURE — 90750 HZV VACC RECOMBINANT IM: CPT

## 2023-09-13 PROCEDURE — 90471 IMMUNIZATION ADMIN: CPT

## 2023-09-20 ENCOUNTER — NON-APPOINTMENT (OUTPATIENT)
Age: 63
End: 2023-09-20

## 2023-09-28 ENCOUNTER — NON-APPOINTMENT (OUTPATIENT)
Age: 63
End: 2023-09-28

## 2023-09-29 ENCOUNTER — APPOINTMENT (OUTPATIENT)
Dept: INTERNAL MEDICINE | Facility: CLINIC | Age: 63
End: 2023-09-29
Payer: COMMERCIAL

## 2023-09-29 VITALS
DIASTOLIC BLOOD PRESSURE: 70 MMHG | HEART RATE: 80 BPM | SYSTOLIC BLOOD PRESSURE: 118 MMHG | BODY MASS INDEX: 23.19 KG/M2 | OXYGEN SATURATION: 97 % | HEIGHT: 62 IN | WEIGHT: 126 LBS | RESPIRATION RATE: 14 BRPM

## 2023-09-29 DIAGNOSIS — J06.9 ACUTE UPPER RESPIRATORY INFECTION, UNSPECIFIED: ICD-10-CM

## 2023-09-29 DIAGNOSIS — R05.9 COUGH, UNSPECIFIED: ICD-10-CM

## 2023-09-29 PROCEDURE — 99213 OFFICE O/P EST LOW 20 MIN: CPT

## 2023-10-16 ENCOUNTER — OUTPATIENT (OUTPATIENT)
Dept: OUTPATIENT SERVICES | Facility: HOSPITAL | Age: 63
LOS: 1 days | End: 2023-10-16
Payer: COMMERCIAL

## 2023-10-16 ENCOUNTER — APPOINTMENT (OUTPATIENT)
Dept: RADIOLOGY | Facility: CLINIC | Age: 63
End: 2023-10-16
Payer: COMMERCIAL

## 2023-10-16 ENCOUNTER — NON-APPOINTMENT (OUTPATIENT)
Age: 63
End: 2023-10-16

## 2023-10-16 DIAGNOSIS — R05.9 COUGH, UNSPECIFIED: ICD-10-CM

## 2023-10-16 DIAGNOSIS — Z90.12 ACQUIRED ABSENCE OF LEFT BREAST AND NIPPLE: Chronic | ICD-10-CM

## 2023-10-16 DIAGNOSIS — N60.02 SOLITARY CYST OF LEFT BREAST: Chronic | ICD-10-CM

## 2023-10-16 PROCEDURE — 71046 X-RAY EXAM CHEST 2 VIEWS: CPT

## 2023-10-16 PROCEDURE — 71046 X-RAY EXAM CHEST 2 VIEWS: CPT | Mod: 26

## 2023-10-17 ENCOUNTER — NON-APPOINTMENT (OUTPATIENT)
Age: 63
End: 2023-10-17

## 2023-10-18 ENCOUNTER — NON-APPOINTMENT (OUTPATIENT)
Age: 63
End: 2023-10-18

## 2023-10-18 DIAGNOSIS — J04.10 ACUTE TRACHEITIS W/OUT OBSTRUCTION: ICD-10-CM

## 2023-10-18 LAB
INFLUENZA A RESULT: NOT DETECTED
INFLUENZA B RESULT: NOT DETECTED
RESP SYN VIRUS RESULT: NOT DETECTED
SARS-COV-2 RESULT: NOT DETECTED

## 2023-10-18 RX ORDER — DOXYCYCLINE 100 MG/1
100 CAPSULE ORAL TWICE DAILY
Qty: 14 | Refills: 0 | Status: DISCONTINUED | COMMUNITY
Start: 2023-09-29 | End: 2023-10-18

## 2023-10-30 ENCOUNTER — APPOINTMENT (OUTPATIENT)
Dept: INTERNAL MEDICINE | Facility: CLINIC | Age: 63
End: 2023-10-30
Payer: COMMERCIAL

## 2023-10-30 VITALS
HEIGHT: 62 IN | TEMPERATURE: 97.6 F | HEART RATE: 68 BPM | BODY MASS INDEX: 24.84 KG/M2 | DIASTOLIC BLOOD PRESSURE: 80 MMHG | SYSTOLIC BLOOD PRESSURE: 120 MMHG | RESPIRATION RATE: 11 BRPM | WEIGHT: 135 LBS | OXYGEN SATURATION: 97 %

## 2023-10-30 DIAGNOSIS — R09.82 POSTNASAL DRIP: ICD-10-CM

## 2023-10-30 PROCEDURE — 99213 OFFICE O/P EST LOW 20 MIN: CPT

## 2023-10-30 RX ORDER — PREDNISONE 20 MG/1
20 TABLET ORAL
Qty: 10 | Refills: 0 | Status: DISCONTINUED | COMMUNITY
Start: 2023-10-18 | End: 2023-10-30

## 2023-10-30 RX ORDER — FLUTICASONE PROPIONATE 50 UG/1
50 SPRAY, METERED NASAL
Qty: 1 | Refills: 0 | Status: ACTIVE | COMMUNITY
Start: 2023-10-30 | End: 1900-01-01

## 2023-11-09 ENCOUNTER — APPOINTMENT (OUTPATIENT)
Dept: PULMONOLOGY | Facility: CLINIC | Age: 63
End: 2023-11-09
Payer: COMMERCIAL

## 2023-11-09 VITALS
SYSTOLIC BLOOD PRESSURE: 134 MMHG | HEART RATE: 86 BPM | HEIGHT: 62 IN | RESPIRATION RATE: 16 BRPM | WEIGHT: 128 LBS | BODY MASS INDEX: 23.55 KG/M2 | DIASTOLIC BLOOD PRESSURE: 72 MMHG | OXYGEN SATURATION: 98 %

## 2023-11-09 DIAGNOSIS — K21.9 GASTRO-ESOPHAGEAL REFLUX DISEASE W/OUT ESOPHAGITIS: ICD-10-CM

## 2023-11-09 PROCEDURE — 99204 OFFICE O/P NEW MOD 45 MIN: CPT

## 2023-11-09 RX ORDER — ASCORBIC ACID 125 MG
TABLET,CHEWABLE ORAL
Refills: 0 | Status: ACTIVE | COMMUNITY

## 2023-11-09 RX ORDER — OMEGA-3/DHA/EPA/FISH OIL 300-1000MG
CAPSULE ORAL
Refills: 0 | Status: ACTIVE | COMMUNITY

## 2023-11-09 RX ORDER — AZELASTINE HYDROCHLORIDE 137 UG/1
0.1 SPRAY, METERED NASAL
Qty: 1 | Refills: 6 | Status: ACTIVE | COMMUNITY
Start: 2023-11-09 | End: 1900-01-01

## 2023-11-09 RX ORDER — ALBUTEROL SULFATE 90 UG/1
108 (90 BASE) INHALANT RESPIRATORY (INHALATION)
Qty: 1 | Refills: 5 | Status: ACTIVE | COMMUNITY
Start: 2023-11-09 | End: 1900-01-01

## 2023-11-09 RX ORDER — FLUTICASONE FUROATE AND VILANTEROL TRIFENATATE 100; 25 UG/1; UG/1
100-25 POWDER RESPIRATORY (INHALATION)
Qty: 1 | Refills: 5 | Status: ACTIVE | COMMUNITY
Start: 2023-11-09 | End: 1900-01-01

## 2023-11-09 RX ORDER — FAMOTIDINE 20 MG/1
20 TABLET, FILM COATED ORAL
Qty: 30 | Refills: 3 | Status: ACTIVE | COMMUNITY
Start: 2023-11-09 | End: 1900-01-01

## 2023-11-14 ENCOUNTER — APPOINTMENT (OUTPATIENT)
Dept: OTOLARYNGOLOGY | Facility: CLINIC | Age: 63
End: 2023-11-14
Payer: COMMERCIAL

## 2023-11-14 VITALS
BODY MASS INDEX: 22.32 KG/M2 | HEIGHT: 63 IN | OXYGEN SATURATION: 99 % | SYSTOLIC BLOOD PRESSURE: 134 MMHG | DIASTOLIC BLOOD PRESSURE: 85 MMHG | HEART RATE: 62 BPM | WEIGHT: 126 LBS

## 2023-11-14 DIAGNOSIS — J31.0 CHRONIC RHINITIS: ICD-10-CM

## 2023-11-14 PROCEDURE — 31575 DIAGNOSTIC LARYNGOSCOPY: CPT

## 2023-11-14 PROCEDURE — 99244 OFF/OP CNSLTJ NEW/EST MOD 40: CPT | Mod: 25

## 2023-11-14 RX ORDER — PYRIDOXINE HCL (VITAMIN B6) 100 MG
TABLET ORAL
Refills: 0 | Status: ACTIVE | COMMUNITY

## 2023-11-14 RX ORDER — PREDNISONE 10 MG/1
10 TABLET ORAL
Qty: 30 | Refills: 0 | Status: COMPLETED | COMMUNITY
Start: 2023-10-30 | End: 2023-11-14

## 2023-12-04 ENCOUNTER — APPOINTMENT (OUTPATIENT)
Dept: GASTROENTEROLOGY | Facility: CLINIC | Age: 63
End: 2023-12-04

## 2023-12-11 ENCOUNTER — APPOINTMENT (OUTPATIENT)
Dept: PULMONOLOGY | Facility: CLINIC | Age: 63
End: 2023-12-11
Payer: COMMERCIAL

## 2023-12-11 VITALS
DIASTOLIC BLOOD PRESSURE: 72 MMHG | HEIGHT: 63 IN | WEIGHT: 124 LBS | OXYGEN SATURATION: 99 % | SYSTOLIC BLOOD PRESSURE: 126 MMHG | RESPIRATION RATE: 16 BRPM | HEART RATE: 84 BPM | BODY MASS INDEX: 21.97 KG/M2

## 2023-12-11 PROCEDURE — 99213 OFFICE O/P EST LOW 20 MIN: CPT | Mod: 25

## 2023-12-11 PROCEDURE — 95012 NITRIC OXIDE EXP GAS DETER: CPT

## 2023-12-14 ENCOUNTER — APPOINTMENT (OUTPATIENT)
Dept: INTERNAL MEDICINE | Facility: CLINIC | Age: 63
End: 2023-12-14
Payer: COMMERCIAL

## 2023-12-14 ENCOUNTER — NON-APPOINTMENT (OUTPATIENT)
Age: 63
End: 2023-12-14

## 2023-12-14 VITALS
HEIGHT: 63 IN | HEART RATE: 75 BPM | BODY MASS INDEX: 23.04 KG/M2 | SYSTOLIC BLOOD PRESSURE: 130 MMHG | OXYGEN SATURATION: 96 % | RESPIRATION RATE: 12 BRPM | DIASTOLIC BLOOD PRESSURE: 72 MMHG | WEIGHT: 130 LBS

## 2023-12-14 DIAGNOSIS — Z85.3 PERSONAL HISTORY OF MALIGNANT NEOPLASM OF BREAST: ICD-10-CM

## 2023-12-14 DIAGNOSIS — M81.0 AGE-RELATED OSTEOPOROSIS W/OUT CURRENT PATHOLOGICAL FRACTURE: ICD-10-CM

## 2023-12-14 DIAGNOSIS — Z00.00 ENCOUNTER FOR GENERAL ADULT MEDICAL EXAMINATION W/OUT ABNORMAL FINDINGS: ICD-10-CM

## 2023-12-14 DIAGNOSIS — E55.9 VITAMIN D DEFICIENCY, UNSPECIFIED: ICD-10-CM

## 2023-12-14 DIAGNOSIS — R05.3 CHRONIC COUGH: ICD-10-CM

## 2023-12-14 DIAGNOSIS — R73.01 IMPAIRED FASTING GLUCOSE: ICD-10-CM

## 2023-12-14 PROCEDURE — 36415 COLL VENOUS BLD VENIPUNCTURE: CPT

## 2023-12-14 PROCEDURE — 93000 ELECTROCARDIOGRAM COMPLETE: CPT

## 2023-12-14 PROCEDURE — 99396 PREV VISIT EST AGE 40-64: CPT | Mod: 25

## 2023-12-14 NOTE — HISTORY OF PRESENT ILLNESS
[FreeTextEntry1] : 63-year-old female without any chronic medications and no chronic medical issues presents for her yearly physical.  Patient generally feeling well without any complaints other than she had an upper respiratory issue September 2023 which resolved other than she had a persistent dry hacking cough which persisted without improvement with treatment given.  She saw ENT without abnormalities.  Most recently has seen pulmonary with diagnosis of intermittent asthma and given Breo inhaler and patient states she is much better.  She has follow-up with pulmonary scheduled.  Patient's main history is that she had left breast cancer in 2006 with ultimate left mastectomy with strong family history of breast cancer and patient being BRCA+ Patient also had prophylactic  bilateral salpingo-oophorectomy She was on tamoxifen for 5 years from 2008 to 2013 and now follows with oncology at [facility]. She is surveyed  every 6 months with alternating mammographies and MRIs. Ultimately the patient had a prophylactic right mastectomy March 2019 and is doing well.  Patient otherwise with no significant medical history Patient generally feels well without complaints including no chest pain, palpitations, shortness of breath or edema. Her only complaint is increased bilateral knee pain  She does have osteoporosis for which she follows with rheumatology and now is on Prolia.  The patient's  passed away about July 2020 secondary to chronic heart failure from valvular heart disease.The patient is coping well. 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

## 2023-12-14 NOTE — HEALTH RISK ASSESSMENT
[Very Good] : ~his/her~  mood as very good [Yes] : Yes [Monthly or less (1 pt)] : Monthly or less (1 point) [1 or 2 (0 pts)] : 1 or 2 (0 points) [Never (0 pts)] : Never (0 points) [No] : In the past 12 months have you used drugs other than those required for medical reasons? No [No falls in past year] : Patient reported no falls in the past year [0] : 2) Feeling down, depressed, or hopeless: Not at all (0) [PHQ-2 Negative - No further assessment needed] : PHQ-2 Negative - No further assessment needed [Patient reported mammogram was normal] : Patient reported mammogram was normal [Patient reported PAP Smear was normal] : Patient reported PAP Smear was normal [None] : None [Alone] : lives alone [# of Members in Household ___] :  household currently consist of [unfilled] member(s) [Retired] : retired [Graduate School] : graduate school [] :  [# Of Children ___] : has [unfilled] children [Sexually Active] : sexually active [Feels Safe at Home] : Feels safe at home [Fully functional (bathing, dressing, toileting, transferring, walking, feeding)] : Fully functional (bathing, dressing, toileting, transferring, walking, feeding) [Fully functional (using the telephone, shopping, preparing meals, housekeeping, doing laundry, using] : Fully functional and needs no help or supervision to perform IADLs (using the telephone, shopping, preparing meals, housekeeping, doing laundry, using transportation, managing medications and managing finances) [Smoke Detector] : smoke detector [Carbon Monoxide Detector] : carbon monoxide detector [Seat Belt] :  uses seat belt [Sunscreen] : uses sunscreen [Reviewed no changes] : Reviewed, no changes [Never] : Never [Discussed at today's visit] : Advance Directives Discussed at today's visit [Designated Healthcare Proxy] : Designated healthcare proxy [Name: ___] : Health Care Proxy's Name: [unfilled]  [Audit-CScore] : 1 [de-identified] : occ exercise [de-identified] : good [[id number]] : 0 [Change in mental status noted] : No change in mental status noted [Reports changes in hearing] : Reports no changes in hearing [Reports changes in vision] : Reports no changes in vision [Reports changes in dental health] : Reports no changes in dental health [Guns at Home] : no guns at home [MammogramDate] : 06/18 [MammogramComments] : Status post bilateral mastectomy therefore no longer needed [PapSmearDate] : 07/22 [HepatitisCDate] : 03/16 [HepatitisCComments] : negative [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

## 2023-12-14 NOTE — PHYSICAL EXAM
[General Appearance - Alert] : alert [General Appearance - In No Acute Distress] : in no acute distress [PERRL With Normal Accommodation] : pupils were equal in size, round, and reactive to light [Sclera] : the sclera and conjunctiva were normal [Extraocular Movements] : extraocular movements were intact [Outer Ear] : the ears and nose were normal in appearance [Oropharynx] : the oropharynx was normal [Neck Appearance] : the appearance of the neck was normal [Neck Cervical Mass (___cm)] : no neck mass was observed [Jugular Venous Distention Increased] : there was no jugular-venous distention [Thyroid Diffuse Enlargement] : the thyroid was not enlarged [Thyroid Nodule] : there were no palpable thyroid nodules [Auscultation Breath Sounds / Voice Sounds] : lungs were clear to auscultation bilaterally [Heart Rate And Rhythm] : heart rate was normal and rhythm regular [Heart Sounds] : normal S1 and S2 [Heart Sounds Gallop] : no gallops [Murmurs] : no murmurs [Heart Sounds Pericardial Friction Rub] : no pericardial rub [Arterial Pulses Carotid] : carotid pulses were normal with no bruits [Abdominal Aorta] : the abdominal aorta was normal [Full Pulse] : the pedal pulses are present [Arterial Pulses Femoral] : femoral pulses were normal without bruits [Edema] : there was no peripheral edema [Veins - Varicosity Changes] : there were no varicosital changes [Abdomen Soft] : soft [Bowel Sounds] : normal bowel sounds [Abdomen Tenderness] : non-tender [Abdomen Mass (___ Cm)] : no abdominal mass palpated [Cervical Lymph Nodes Enlarged Posterior Bilaterally] : posterior cervical [Cervical Lymph Nodes Enlarged Anterior Bilaterally] : anterior cervical [Supraclavicular Lymph Nodes Enlarged Bilaterally] : supraclavicular [Femoral Lymph Nodes Enlarged Bilaterally] : femoral [Axillary Lymph Nodes Enlarged Bilaterally] : axillary [Inguinal Lymph Nodes Enlarged Bilaterally] : inguinal [No Spinal Tenderness] : no spinal tenderness [Nail Clubbing] : no clubbing  or cyanosis of the fingernails [Abnormal Walk] : normal gait [Musculoskeletal - Swelling] : no joint swelling seen [Motor Tone] : muscle strength and tone were normal [Skin Color & Pigmentation] : normal skin color and pigmentation [Skin Turgor] : normal skin turgor [] : no rash [Cranial Nerves] : cranial nerves 2-12 were intact [No Focal Deficits] : no focal deficits [Oriented To Time, Place, And Person] : oriented to person, place, and time [Impaired Insight] : insight and judgment were intact [Affect] : the affect was normal [FreeTextEntry1] : Positive right knee brace [Over the Past 2 Weeks, Have You Felt Down, Depressed, or Hopeless?] : 1.) Over the past 2 weeks, have you felt down, depressed, or hopeless? No [Over the Past 2 Weeks, Have You Felt Little Interest or Pleasure Doing Things?] : 2.) Over the past 2 weeks, have you felt little interest or pleasure doing things? No

## 2023-12-14 NOTE — ASSESSMENT
[FreeTextEntry1] : 63-year-old female with good general medical health currently medically stable with no active medical issues.  Patient with history of left breast cancer in 2006 with no evidence of disease and patient up-to-date with followups. Now status post bilateral mastectomies therefore no further mammographies needed.  Persistent cough after upper respiratory illness September 2023 most recently having seen pulmonary with diagnosis of intermittent asthma being treated with Breo inhaler with much improvement.  Follow-up with pulmonary as scheduled.  Patient encouraged to continue diet and exercise  Increased bilateral knee pain therefore referred to orthopedic  Osteoporosis followed by rheumatology with patient on Prolia  Patient doing quite well mentally after the death of her  July 2020.  Colonoscopy 2009 with followup in 5 years therefore patient again given referral.  Stool FIT test - December 2022.  Patient states she has appointment with gastroenterology to schedule colonoscopy. Bone density April 2023 GYN Dr. Melvin Mammography/MRI No need for followup  Influenza and COVID vaccines already received Shingrix #1 received September 2023 with patient declining #2  Venipuncture done today in the office  Followup yearly and as needed.

## 2023-12-16 LAB
25(OH)D3 SERPL-MCNC: 61.4 NG/ML
ALBUMIN SERPL ELPH-MCNC: 4.2 G/DL
ALP BLD-CCNC: 58 U/L
ALT SERPL-CCNC: 28 U/L
ANION GAP SERPL CALC-SCNC: 11 MMOL/L
APPEARANCE: CLEAR
AST SERPL-CCNC: 31 U/L
BACTERIA: NEGATIVE /HPF
BASOPHILS # BLD AUTO: 0.06 K/UL
BASOPHILS NFR BLD AUTO: 1.2 %
BILIRUB SERPL-MCNC: 0.2 MG/DL
BILIRUBIN URINE: NEGATIVE
BLOOD URINE: NEGATIVE
BUN SERPL-MCNC: 18 MG/DL
CALCIUM SERPL-MCNC: 9.4 MG/DL
CAST: 0 /LPF
CHLORIDE SERPL-SCNC: 103 MMOL/L
CHOLEST SERPL-MCNC: 195 MG/DL
CO2 SERPL-SCNC: 25 MMOL/L
COLOR: YELLOW
CREAT SERPL-MCNC: 0.95 MG/DL
EGFR: 67 ML/MIN/1.73M2
EOSINOPHIL # BLD AUTO: 0.2 K/UL
EOSINOPHIL NFR BLD AUTO: 3.9 %
EPITHELIAL CELLS: 0 /HPF
ESTIMATED AVERAGE GLUCOSE: 117 MG/DL
GLUCOSE QUALITATIVE U: NEGATIVE MG/DL
GLUCOSE SERPL-MCNC: 95 MG/DL
HBA1C MFR BLD HPLC: 5.7 %
HCT VFR BLD CALC: 36.8 %
HDLC SERPL-MCNC: 91 MG/DL
HGB BLD-MCNC: 12 G/DL
IMM GRANULOCYTES NFR BLD AUTO: 0.4 %
KETONES URINE: NEGATIVE MG/DL
LDLC SERPL CALC-MCNC: 91 MG/DL
LEUKOCYTE ESTERASE URINE: NEGATIVE
LYMPHOCYTES # BLD AUTO: 1.33 K/UL
LYMPHOCYTES NFR BLD AUTO: 26.1 %
MAN DIFF?: NORMAL
MCHC RBC-ENTMCNC: 30.2 PG
MCHC RBC-ENTMCNC: 32.6 GM/DL
MCV RBC AUTO: 92.7 FL
MICROSCOPIC-UA: NORMAL
MONOCYTES # BLD AUTO: 0.67 K/UL
MONOCYTES NFR BLD AUTO: 13.2 %
NEUTROPHILS # BLD AUTO: 2.81 K/UL
NEUTROPHILS NFR BLD AUTO: 55.2 %
NITRITE URINE: NEGATIVE
NONHDLC SERPL-MCNC: 104 MG/DL
PH URINE: 6.5
PLATELET # BLD AUTO: 194 K/UL
POTASSIUM SERPL-SCNC: 4.2 MMOL/L
PROT SERPL-MCNC: 6.8 G/DL
PROTEIN URINE: NEGATIVE MG/DL
RBC # BLD: 3.97 M/UL
RBC # FLD: 13.6 %
RED BLOOD CELLS URINE: 1 /HPF
SODIUM SERPL-SCNC: 139 MMOL/L
SPECIFIC GRAVITY URINE: 1.01
TRIGL SERPL-MCNC: 68 MG/DL
UROBILINOGEN URINE: 0.2 MG/DL
WBC # FLD AUTO: 5.09 K/UL
WHITE BLOOD CELLS URINE: 0 /HPF

## 2023-12-18 ENCOUNTER — APPOINTMENT (OUTPATIENT)
Dept: PULMONOLOGY | Facility: CLINIC | Age: 63
End: 2023-12-18
Payer: COMMERCIAL

## 2023-12-18 VITALS
DIASTOLIC BLOOD PRESSURE: 72 MMHG | SYSTOLIC BLOOD PRESSURE: 132 MMHG | RESPIRATION RATE: 14 BRPM | HEART RATE: 70 BPM | OXYGEN SATURATION: 98 %

## 2023-12-18 VITALS — HEIGHT: 62.6 IN | WEIGHT: 126 LBS | BODY MASS INDEX: 22.61 KG/M2

## 2023-12-18 DIAGNOSIS — J45.22 MILD INTERMITTENT ASTHMA WITH STATUS ASTHMATICUS: ICD-10-CM

## 2023-12-18 PROCEDURE — 85018 HEMOGLOBIN: CPT | Mod: QW

## 2023-12-18 PROCEDURE — 94010 BREATHING CAPACITY TEST: CPT

## 2023-12-18 PROCEDURE — 94729 DIFFUSING CAPACITY: CPT

## 2023-12-18 PROCEDURE — 99213 OFFICE O/P EST LOW 20 MIN: CPT | Mod: 25

## 2023-12-18 PROCEDURE — 94727 GAS DIL/WSHOT DETER LNG VOL: CPT

## 2023-12-18 RX ORDER — AZITHROMYCIN 250 MG/1
250 TABLET, FILM COATED ORAL
Qty: 1 | Refills: 0 | Status: ACTIVE | COMMUNITY
Start: 2023-12-18 | End: 1900-01-01

## 2023-12-18 NOTE — HISTORY OF PRESENT ILLNESS
[Never] : never [TextBox_4] : 11/9/2023 64y/o  female   never smoker   born in [place]     retired  teacher   adult onset    allergies  here for cough  - allergic to ceder pollen and dust - never covid     hx - two months    cough     felt  allergic     related then  went to urgent care --   Robitussin    no abx -then went  to   primary cxr clear  steroids and  abx  given  - not better -  feels nothing  made patient  better - then steroids  longer   ---     - sob    when she eats   laugh     and    e -dry cough  -flonase  with some help   12/11/2023 64y/o   female  never smoker born in [place]   retired   teacher  cough   asthma - Jan feb  genaro Gordon  -  off steroids po -breo  100 qd       tolerating    doing well  - albuterol  not needed  - no chest pain  feeling better -  12/18/2023 64y/o  female  [place] smoker   [place]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

## 2023-12-18 NOTE — REVIEW OF SYSTEMS
[Fatigue] : fatigue [Sore Throat] : sore throat [Cough] : cough [Sputum] : sputum [Palpitations] : palpitations [Fever] : no fever [Recent Wt Gain (___ Lbs)] : ~T no recent weight gain [Chills] : no chills [Recent Wt Loss (___ Lbs)] : ~T no recent weight loss [Dry Mouth] : no dry mouth [Hemoptysis] : no hemoptysis [Dyspnea] : no dyspnea [Wheezing] : no wheezing [Chest Discomfort] : no chest discomfort [GERD] : no gerd [Abdominal Pain] : no abdominal pain [Nausea] : no nausea [Vomiting] : no vomiting [Dysphagia] : no dysphagia [Bleeding] : no bleeding [Chronic Pain] : no chronic pain [Rash] : no rash [Blood Transfusion] : no blood transfusion [Clotting Disorder/ Frequent bleeding] : no clotting disorder/ frequent bleeding [Diabetes] : no diabetes [Thyroid Problem] : no thyroid problem [Obesity] : no obesity [TextBox_30] : mucous  clear

## 2023-12-18 NOTE — PROCEDURE
[FreeTextEntry1] : 12/18/2023 good efforts    normal  FVC  FEV1    R   normal  fef  25-75%       TLC normal   dlco normal   12/11/2023   FENO  38 ppm

## 2023-12-18 NOTE — ASSESSMENT
[FreeTextEntry1] : 64y/o  female    never smoker  1-     asthma mild intermittent  trigger  URI   allergies  2- allergic rhinitis 3- vaccinations  refuses flu  no  RSV   Recommendations 1-   d/c breo 100 qd   as   needed  for travel  2-    albuterol MDI  two inh    q  6 hour prn  3-  prevention discussed  mask  4- to travel for three months  return prior to travel 5- z luis m   for travel    return in  four months

## 2023-12-18 NOTE — PHYSICAL EXAM
[III] : Mallampati Class: III [Normal Appearance] : normal appearance [Supple] : supple [No JVD] : no jvd [Normal Rate/Rhythm] : normal rate/rhythm [Normal S1, S2] : normal s1, s2 [No Murmurs] : no murmurs [No Resp Distress] : no resp distress [No Acc Muscle Use] : no acc muscle use [Clear to Auscultation Bilaterally] : clear to auscultation bilaterally [Benign] : benign [Not Tender] : not tender [No Masses] : no masses [Soft] : soft [No Hernias] : no hernias [Normal Bowel Sounds] : normal bowel sounds [Normal Gait] : normal gait [No Clubbing] : no clubbing [No Edema] : no edema [No Rash] : no rash [No Motor Deficits] : no motor deficits [Normal Affect] : normal affect [TextBox_2] : pleasant f no distress speaking full sentences no cough  [TextBox_11] : mois tno lesion

## 2024-05-06 ENCOUNTER — APPOINTMENT (OUTPATIENT)
Dept: DERMATOLOGY | Facility: CLINIC | Age: 64
End: 2024-05-06
Payer: MEDICARE

## 2024-05-06 PROCEDURE — 99213 OFFICE O/P EST LOW 20 MIN: CPT

## 2024-05-13 ENCOUNTER — OFFICE (OUTPATIENT)
Dept: URBAN - METROPOLITAN AREA CLINIC 115 | Facility: CLINIC | Age: 64
Setting detail: OPHTHALMOLOGY
End: 2024-05-13
Payer: MEDICARE

## 2024-05-13 DIAGNOSIS — H43.392: ICD-10-CM

## 2024-05-13 DIAGNOSIS — H10.45: ICD-10-CM

## 2024-05-13 DIAGNOSIS — H02.834: ICD-10-CM

## 2024-05-13 DIAGNOSIS — H04.123: ICD-10-CM

## 2024-05-13 DIAGNOSIS — H43.812: ICD-10-CM

## 2024-05-13 DIAGNOSIS — H52.13: ICD-10-CM

## 2024-05-13 DIAGNOSIS — H01.002: ICD-10-CM

## 2024-05-13 DIAGNOSIS — H01.005: ICD-10-CM

## 2024-05-13 DIAGNOSIS — H35.033: ICD-10-CM

## 2024-05-13 DIAGNOSIS — H25.13: ICD-10-CM

## 2024-05-13 PROCEDURE — 92014 COMPRE OPH EXAM EST PT 1/>: CPT | Performed by: OPHTHALMOLOGY

## 2024-05-13 ASSESSMENT — LID POSITION - DERMATOCHALASIS: OS_DERMATOCHALASIS: LUL T 1+

## 2024-05-13 ASSESSMENT — CONFRONTATIONAL VISUAL FIELD TEST (CVF)
OD_FINDINGS: FULL
OS_FINDINGS: FULL

## 2024-05-13 ASSESSMENT — LID EXAM ASSESSMENTS
OD_BLEPHARITIS: RLL 1+
OS_BLEPHARITIS: LLL 1+

## 2024-05-14 ENCOUNTER — APPOINTMENT (OUTPATIENT)
Dept: PULMONOLOGY | Facility: CLINIC | Age: 64
End: 2024-05-14
Payer: MEDICARE

## 2024-05-14 VITALS
OXYGEN SATURATION: 98 % | DIASTOLIC BLOOD PRESSURE: 70 MMHG | WEIGHT: 125 LBS | BODY MASS INDEX: 22.43 KG/M2 | HEIGHT: 62.5 IN | RESPIRATION RATE: 14 BRPM | HEART RATE: 87 BPM | SYSTOLIC BLOOD PRESSURE: 122 MMHG

## 2024-05-14 DIAGNOSIS — J45.20 MILD INTERMITTENT ASTHMA, UNCOMPLICATED: ICD-10-CM

## 2024-05-14 DIAGNOSIS — J30.89 OTHER ALLERGIC RHINITIS: ICD-10-CM

## 2024-05-14 PROCEDURE — G2211 COMPLEX E/M VISIT ADD ON: CPT

## 2024-05-14 PROCEDURE — 99212 OFFICE O/P EST SF 10 MIN: CPT

## 2024-05-14 NOTE — PHYSICAL EXAM
[II] : Mallampati Class: II [Normal Appearance] : normal appearance [Supple] : supple [No Neck Mass] : no neck mass [Normal Rate/Rhythm] : normal rate/rhythm [Normal S1, S2] : normal s1, s2 [No Murmurs] : no murmurs [No Resp Distress] : no resp distress [No Acc Muscle Use] : no acc muscle use [Clear to Auscultation Bilaterally] : clear to auscultation bilaterally [Benign] : benign [Not Tender] : not tender [No Masses] : no masses [Soft] : soft [Normal Bowel Sounds] : normal bowel sounds [Normal Gait] : normal gait [No Clubbing] : no clubbing [No Edema] : no edema [No Rash] : no rash [No Motor Deficits] : no motor deficits [Normal Affect] : normal affect [TextBox_2] : pleasant fn o distress speaking full sentences no cough no sob  [TextBox_11] : moist no lesions

## 2024-05-14 NOTE — HISTORY OF PRESENT ILLNESS
[Never] : never [TextBox_4] : 11/9/2023 62y/o  female   never smoker   born in [place]     retired  teacher   adult onset    allergies  here for cough  - allergic to ceder pollen and dust - never covid     hx - two months    cough     felt  allergic     related then  went to urgent care --   Robitussin    no abx -then went  to   primary cxr clear  steroids and  abx  given  - not better -  feels nothing  made patient  better - then steroids  longer   ---     - sob    when she eats   laugh     and    e -dry cough  -flonase  with some help   12/11/2023 62y/o   female  never smoker born in [place]   retired   teacher  cough   asthma - [facility] [facility]  -  off steroids po -breo  100 qd       tolerating    doing well  - albuterol  not needed  - no chest pain  feeling better -  12/18/2023 62y/o  female  never smoker   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    5/14/2024 65y/o female never smoker      allergic rhinitis reactive airways   back from  [place] - did well  - currently  some cough with allergies -  claritin  - no wheezing  - did not need z luis m  -

## 2024-05-20 ENCOUNTER — NON-APPOINTMENT (OUTPATIENT)
Age: 64
End: 2024-05-20

## 2024-07-22 ENCOUNTER — APPOINTMENT (OUTPATIENT)
Dept: GASTROENTEROLOGY | Facility: CLINIC | Age: 64
End: 2024-07-22
Payer: MEDICARE

## 2024-07-22 VITALS
DIASTOLIC BLOOD PRESSURE: 79 MMHG | SYSTOLIC BLOOD PRESSURE: 129 MMHG | WEIGHT: 130 LBS | BODY MASS INDEX: 23.33 KG/M2 | HEIGHT: 62.5 IN | OXYGEN SATURATION: 98 % | HEART RATE: 66 BPM

## 2024-07-22 DIAGNOSIS — Z12.11 ENCOUNTER FOR SCREENING FOR MALIGNANT NEOPLASM OF COLON: ICD-10-CM

## 2024-07-22 DIAGNOSIS — Z13.9 ENCOUNTER FOR SCREENING, UNSPECIFIED: ICD-10-CM

## 2024-07-22 PROCEDURE — 99204 OFFICE O/P NEW MOD 45 MIN: CPT

## 2024-07-22 RX ORDER — SODIUM SULFATE, POTASSIUM SULFATE AND MAGNESIUM SULFATE 1.6; 3.13; 17.5 G/177ML; G/177ML; G/177ML
17.5-3.13-1.6 SOLUTION ORAL
Qty: 1 | Refills: 0 | Status: ACTIVE | COMMUNITY
Start: 2024-07-22 | End: 1900-01-01

## 2024-07-22 NOTE — PHYSICAL EXAM
[Alert] : alert [Normal Voice/Communication] : normal voice/communication [No Acute Distress] : no acute distress [Sclera] : the sclera and conjunctiva were normal [Hearing Threshold Finger Rub Not [place]] : hearing was normal [Normal Lips/Gums] : the lips and gums were normal [Oropharynx] : the oropharynx was normal [Normal Appearance] : the appearance of the neck was normal [No Neck Mass] : no neck mass was observed [No Respiratory Distress] : no respiratory distress [No Acc Muscle Use] : no accessory muscle use [Respiration, Rhythm And Depth] : normal respiratory rhythm and effort [Auscultation Breath Sounds / Voice Sounds] : lungs were clear to auscultation bilaterally [Heart Rate And Rhythm] : heart rate was normal and rhythm regular [None] : no edema [Bowel Sounds] : normal bowel sounds [Abdomen Tenderness] : non-tender [No Masses] : no abdominal mass palpated [Abdomen Soft] : soft [Cervical Lymph Nodes Enlarged Posterior Bilaterally] : no posterior cervical lymphadenopathy [Supraclavicular Lymph Nodes Enlarged Bilaterally] : no supraclavicular lymphadenopathy [Cervical Lymph Nodes Enlarged Anterior Bilaterally] : no anterior cervical lymphadenopathy [No CVA Tenderness] : no CVA  tenderness [No Spinal Tenderness] : no spinal tenderness [Abnormal Walk] : normal gait

## 2024-07-22 NOTE — REVIEW OF SYSTEMS
[Negative] : Integumentary [Fever] : no fever [Chills] : no chills [Feeling Tired] : not feeling tired [Recent Weight Loss (___ Lbs)] : no recent weight loss [Abdominal Pain] : no abdominal pain [Vomiting] : no vomiting [Constipation] : no constipation [Diarrhea] : no diarrhea [Heartburn] : no heartburn [Melena (black stool)] : no melena [Bleeding] : no bleeding [Bloating (gassiness)] : no bloating

## 2024-07-22 NOTE — ASSESSMENT
[FreeTextEntry1] : 64-year-old female with a remote history of breast cancer status postmastectomy who presents for average risk colon cancer screening  Average risk colon cancer screening Procedure and instructions reviewed with patient Bowel prep sent to pharmacy

## 2024-08-02 ENCOUNTER — OFFICE (OUTPATIENT)
Dept: URBAN - METROPOLITAN AREA CLINIC 113 | Facility: CLINIC | Age: 64
Setting detail: OPHTHALMOLOGY
End: 2024-08-02
Payer: MEDICARE

## 2024-08-02 DIAGNOSIS — H01.002: ICD-10-CM

## 2024-08-02 DIAGNOSIS — H25.13: ICD-10-CM

## 2024-08-02 DIAGNOSIS — H04.123: ICD-10-CM

## 2024-08-02 DIAGNOSIS — H02.834: ICD-10-CM

## 2024-08-02 DIAGNOSIS — H01.005: ICD-10-CM

## 2024-08-02 PROCEDURE — 99213 OFFICE O/P EST LOW 20 MIN: CPT | Performed by: OPHTHALMOLOGY

## 2024-08-02 ASSESSMENT — CONFRONTATIONAL VISUAL FIELD TEST (CVF)
OD_FINDINGS: FULL
OS_FINDINGS: FULL

## 2024-08-02 ASSESSMENT — LID POSITION - DERMATOCHALASIS: OS_DERMATOCHALASIS: LUL T 1+

## 2024-08-02 ASSESSMENT — LID EXAM ASSESSMENTS
OS_BLEPHARITIS: LLL 1+
OD_BLEPHARITIS: RLL 1+

## 2024-08-03 ENCOUNTER — NON-APPOINTMENT (OUTPATIENT)
Age: 64
End: 2024-08-03

## 2024-09-03 ENCOUNTER — TRANSCRIPTION ENCOUNTER (OUTPATIENT)
Age: 64
End: 2024-09-03

## 2024-09-04 ENCOUNTER — APPOINTMENT (OUTPATIENT)
Dept: GASTROENTEROLOGY | Facility: GI CENTER | Age: 64
End: 2024-09-04
Payer: MEDICARE

## 2024-09-04 ENCOUNTER — OUTPATIENT (OUTPATIENT)
Dept: OUTPATIENT SERVICES | Facility: HOSPITAL | Age: 64
LOS: 1 days | End: 2024-09-04
Payer: MEDICARE

## 2024-09-04 DIAGNOSIS — Z90.12 ACQUIRED ABSENCE OF LEFT BREAST AND NIPPLE: Chronic | ICD-10-CM

## 2024-09-04 DIAGNOSIS — Z98.890 OTHER SPECIFIED POSTPROCEDURAL STATES: Chronic | ICD-10-CM

## 2024-09-04 DIAGNOSIS — Z12.11 ENCOUNTER FOR SCREENING FOR MALIGNANT NEOPLASM OF COLON: ICD-10-CM

## 2024-09-04 DIAGNOSIS — N60.02 SOLITARY CYST OF LEFT BREAST: Chronic | ICD-10-CM

## 2024-09-04 PROCEDURE — G0121: CPT

## 2024-09-04 PROCEDURE — 45378 DIAGNOSTIC COLONOSCOPY: CPT | Mod: PT

## 2024-09-04 NOTE — REVIEW OF SYSTEMS
[Fever] : no fever [Chills] : no chills [Feeling Tired] : not feeling tired [Recent Weight Loss (___ Lbs)] : no recent weight loss [Abdominal Pain] : no abdominal pain [Vomiting] : no vomiting [Constipation] : no constipation [Diarrhea] : no diarrhea [Heartburn] : no heartburn [Melena (black stool)] : no melena [Swollen Glands] : no swollen glands [Negative] : Integumentary

## 2024-09-04 NOTE — PHYSICAL EXAM
[Alert] : alert [Normal Voice/Communication] : normal voice/communication [No Acute Distress] : no acute distress [Sclera] : the sclera and conjunctiva were normal [Hearing Threshold Finger Rub Not [place]] : hearing was normal [Normal Lips/Gums] : the lips and gums were normal [Oropharynx] : the oropharynx was normal [Normal Appearance] : the appearance of the neck was normal [No Neck Mass] : no neck mass was observed [No Respiratory Distress] : no respiratory distress [No Acc Muscle Use] : no accessory muscle use [Respiration, Rhythm And Depth] : normal respiratory rhythm and effort [Auscultation Breath Sounds / Voice Sounds] : lungs were clear to auscultation bilaterally [Heart Rate And Rhythm] : heart rate was normal and rhythm regular [None] : no edema [Bowel Sounds] : normal bowel sounds [Abdomen Tenderness] : non-tender [No Masses] : no abdominal mass palpated [Abdomen Soft] : soft [Cervical Lymph Nodes Enlarged Posterior Bilaterally] : no posterior cervical lymphadenopathy [Supraclavicular Lymph Nodes Enlarged Bilaterally] : no supraclavicular lymphadenopathy [Cervical Lymph Nodes Enlarged Anterior Bilaterally] : no anterior cervical lymphadenopathy [No CVA Tenderness] : no CVA  tenderness [No Spinal Tenderness] : no spinal tenderness [Abnormal Walk] : normal gait

## 2024-11-06 ENCOUNTER — OFFICE (OUTPATIENT)
Dept: URBAN - METROPOLITAN AREA CLINIC 115 | Facility: CLINIC | Age: 64
Setting detail: OPHTHALMOLOGY
End: 2024-11-06
Payer: MEDICARE

## 2024-11-06 DIAGNOSIS — H02.834: ICD-10-CM

## 2024-11-06 DIAGNOSIS — H01.002: ICD-10-CM

## 2024-11-06 DIAGNOSIS — H04.123: ICD-10-CM

## 2024-11-06 DIAGNOSIS — H01.005: ICD-10-CM

## 2024-11-06 DIAGNOSIS — H35.033: ICD-10-CM

## 2024-11-06 DIAGNOSIS — H04.122: ICD-10-CM

## 2024-11-06 DIAGNOSIS — H04.121: ICD-10-CM

## 2024-11-06 DIAGNOSIS — H25.13: ICD-10-CM

## 2024-11-06 PROCEDURE — 92250 FUNDUS PHOTOGRAPHY W/I&R: CPT | Performed by: OPHTHALMOLOGY

## 2024-11-06 PROCEDURE — 83861 MICROFLUID ANALY TEARS: CPT | Mod: QW,RT | Performed by: OPHTHALMOLOGY

## 2024-11-06 PROCEDURE — 83861 MICROFLUID ANALY TEARS: CPT | Mod: QW,LT | Performed by: OPHTHALMOLOGY

## 2024-11-06 PROCEDURE — 99213 OFFICE O/P EST LOW 20 MIN: CPT | Performed by: OPHTHALMOLOGY

## 2024-11-06 ASSESSMENT — REFRACTION_MANIFEST
OS_SPHERE: -0.50
OS_CYLINDER: -2.25
OS_ADD: +2.50
OS_SPHERE: -0.50
OD_VA1: 20/20
OD_CYLINDER: -0.50
OD_CYLINDER: -0.50
OS_SPHERE: -0.25
OD_AXIS: 105
OS_SPHERE: -0.25
OD_ADD: +2.50
OD_CYLINDER: -0.50
OD_SPHERE: -0.25
OD_CYLINDER: -0.50
OD_VA1: 20/20
OU_VA: 20/25
OD_AXIS: 105
OD_SPHERE: -0.50
OD_SPHERE: -0.50
OS_AXIS: 075
OD_ADD: +2.50
OS_CYLINDER: -2.25
OS_CYLINDER: -2.50
OS_VA1: 20/25
OU_VA: 20/20-1
OD_VA1: 20/20
OS_CYLINDER: -2.50
OD_VA1: 20/20
OD_AXIS: 095
OS_ADD: +2.50
OU_VA: 20/20
OS_ADD: +2.50
OS_AXIS: 070
OS_AXIS: 075
OS_AXIS: 070
OS_VA1: 20/20
OD_ADD: +2.50
OS_ADD: +2.50
OD_AXIS: 105
OS_VA1: 20/25
OS_VA1: 20/20-1
OD_SPHERE: -0.25
OD_ADD: +2.50

## 2024-11-06 ASSESSMENT — REFRACTION_CURRENTRX
OD_CYLINDER: -0.50
OS_AXIS: 070
OD_VPRISM_DIRECTION: BF
OS_VPRISM_DIRECTION: BF
OS_OVR_VA: 20/
OS_VPRISM_DIRECTION: BF
OD_SPHERE: -0.50
OD_VPRISM_DIRECTION: BF
OD_ADD: +2.50
OS_AXIS: 069
OS_OVR_VA: 20/
OS_ADD: +2.50
OD_CYLINDER: -0.50
OD_OVR_VA: 20/
OS_SPHERE: -0.50
OD_CYLINDER: -0.50
OD_SPHERE: -0.50
OS_OVR_VA: 20/
OS_AXIS: 072
OS_CYLINDER: -2.50
OD_OVR_VA: 20/
OD_AXIS: 100
OD_SPHERE: -0.25
OS_SPHERE: -0.50
OS_ADD: +2.50
OD_OVR_VA: 20/
OS_CYLINDER: -2.50
OD_ADD: +2.50
OD_AXIS: 095
OS_VPRISM_DIRECTION: PROGS
OD_AXIS: 090
OS_SPHERE: -0.75
OD_VPRISM_DIRECTION: PROGS
OS_CYLINDER: -2.75
OD_ADD: +2.50
OS_ADD: +2.50

## 2024-11-06 ASSESSMENT — REFRACTION_AUTOREFRACTION
OS_AXIS: 069
OD_AXIS: 099
OD_SPHERE: -0.50
OD_CYLINDER: -0.25
OS_CYLINDER: -2.25
OS_SPHERE: -0.50

## 2024-11-06 ASSESSMENT — KERATOMETRY
OD_AXISANGLE_DEGREES: 090
OS_AXISANGLE_DEGREES: 159
OD_K2POWER_DIOPTERS: 44.25
OD_K1POWER_DIOPTERS: 44.25
OS_K2POWER_DIOPTERS: 45.00
OS_K1POWER_DIOPTERS: 43.50

## 2024-11-06 ASSESSMENT — VISUAL ACUITY
OD_BCVA: 20/20-1
OS_BCVA: 20/20-1

## 2024-11-06 ASSESSMENT — TONOMETRY
OD_IOP_MMHG: 10
OS_IOP_MMHG: 10

## 2024-11-06 ASSESSMENT — LID EXAM ASSESSMENTS
OS_BLEPHARITIS: LLL 1+
OD_BLEPHARITIS: RLL 1+

## 2024-11-06 ASSESSMENT — LID POSITION - DERMATOCHALASIS: OS_DERMATOCHALASIS: LUL T 1+

## 2024-11-06 ASSESSMENT — CONFRONTATIONAL VISUAL FIELD TEST (CVF)
OS_FINDINGS: FULL
OD_FINDINGS: FULL

## 2024-11-06 ASSESSMENT — SUPERFICIAL PUNCTATE KERATITIS (SPK)
OD_SPK: T
OS_SPK: T

## 2024-12-10 ENCOUNTER — APPOINTMENT (OUTPATIENT)
Dept: PULMONOLOGY | Facility: CLINIC | Age: 64
End: 2024-12-10
Payer: MEDICARE

## 2024-12-10 VITALS
DIASTOLIC BLOOD PRESSURE: 74 MMHG | OXYGEN SATURATION: 98 % | SYSTOLIC BLOOD PRESSURE: 126 MMHG | RESPIRATION RATE: 16 BRPM | HEART RATE: 72 BPM

## 2024-12-10 VITALS — BODY MASS INDEX: 23.21 KG/M2 | HEIGHT: 63 IN | WEIGHT: 131 LBS

## 2024-12-10 DIAGNOSIS — R05.3 CHRONIC COUGH: ICD-10-CM

## 2024-12-10 DIAGNOSIS — J45.909 UNSPECIFIED ASTHMA, UNCOMPLICATED: ICD-10-CM

## 2024-12-10 PROCEDURE — 94010 BREATHING CAPACITY TEST: CPT

## 2024-12-10 PROCEDURE — 99212 OFFICE O/P EST SF 10 MIN: CPT | Mod: 25

## 2024-12-10 PROCEDURE — 85018 HEMOGLOBIN: CPT | Mod: QW

## 2024-12-10 PROCEDURE — 94729 DIFFUSING CAPACITY: CPT

## 2024-12-10 PROCEDURE — 94727 GAS DIL/WSHOT DETER LNG VOL: CPT

## 2024-12-18 ENCOUNTER — NON-APPOINTMENT (OUTPATIENT)
Age: 64
End: 2024-12-18

## 2024-12-18 ENCOUNTER — APPOINTMENT (OUTPATIENT)
Dept: INTERNAL MEDICINE | Facility: CLINIC | Age: 64
End: 2024-12-18
Payer: MEDICARE

## 2024-12-18 VITALS
RESPIRATION RATE: 13 BRPM | HEART RATE: 75 BPM | BODY MASS INDEX: 22.86 KG/M2 | OXYGEN SATURATION: 98 % | SYSTOLIC BLOOD PRESSURE: 120 MMHG | HEIGHT: 63 IN | DIASTOLIC BLOOD PRESSURE: 70 MMHG | WEIGHT: 129 LBS

## 2024-12-18 DIAGNOSIS — E55.9 VITAMIN D DEFICIENCY, UNSPECIFIED: ICD-10-CM

## 2024-12-18 DIAGNOSIS — R73.01 IMPAIRED FASTING GLUCOSE: ICD-10-CM

## 2024-12-18 DIAGNOSIS — Z00.00 ENCOUNTER FOR GENERAL ADULT MEDICAL EXAMINATION W/OUT ABNORMAL FINDINGS: ICD-10-CM

## 2024-12-18 DIAGNOSIS — Z13.6 ENCOUNTER FOR SCREENING FOR CARDIOVASCULAR DISORDERS: ICD-10-CM

## 2024-12-18 DIAGNOSIS — M85.80 OTHER SPECIFIED DISORDERS OF BONE DENSITY AND STRUCTURE, UNSPECIFIED SITE: ICD-10-CM

## 2024-12-18 DIAGNOSIS — M81.0 AGE-RELATED OSTEOPOROSIS W/OUT CURRENT PATHOLOGICAL FRACTURE: ICD-10-CM

## 2024-12-18 PROCEDURE — 36415 COLL VENOUS BLD VENIPUNCTURE: CPT

## 2024-12-18 PROCEDURE — G0403: CPT

## 2024-12-18 PROCEDURE — G0402 INITIAL PREVENTIVE EXAM: CPT

## 2024-12-19 LAB
25(OH)D3 SERPL-MCNC: 44.2 NG/ML
ALBUMIN SERPL ELPH-MCNC: 4.5 G/DL
ALP BLD-CCNC: 54 U/L
ALT SERPL-CCNC: 15 U/L
ANION GAP SERPL CALC-SCNC: 12 MMOL/L
APPEARANCE: CLEAR
AST SERPL-CCNC: 20 U/L
BACTERIA: NEGATIVE /HPF
BASOPHILS # BLD AUTO: 0.05 K/UL
BASOPHILS NFR BLD AUTO: 1.1 %
BILIRUB SERPL-MCNC: 0.4 MG/DL
BILIRUBIN URINE: NEGATIVE
BLOOD URINE: NEGATIVE
BUN SERPL-MCNC: 14 MG/DL
CALCIUM SERPL-MCNC: 9.4 MG/DL
CAST: 0 /LPF
CHLORIDE SERPL-SCNC: 104 MMOL/L
CHOLEST SERPL-MCNC: 234 MG/DL
CO2 SERPL-SCNC: 23 MMOL/L
COLOR: YELLOW
CREAT SERPL-MCNC: 0.73 MG/DL
EGFR: 91 ML/MIN/1.73M2
EOSINOPHIL # BLD AUTO: 0.18 K/UL
EOSINOPHIL NFR BLD AUTO: 4.1 %
EPITHELIAL CELLS: 1 /HPF
ESTIMATED AVERAGE GLUCOSE: 111 MG/DL
GLUCOSE QUALITATIVE U: NEGATIVE MG/DL
GLUCOSE SERPL-MCNC: 102 MG/DL
HBA1C MFR BLD HPLC: 5.5 %
HCT VFR BLD CALC: 39.9 %
HDLC SERPL-MCNC: 85 MG/DL
HGB BLD-MCNC: 13.1 G/DL
IMM GRANULOCYTES NFR BLD AUTO: 0.2 %
KETONES URINE: NEGATIVE MG/DL
LDLC SERPL CALC-MCNC: 133 MG/DL
LEUKOCYTE ESTERASE URINE: ABNORMAL
LYMPHOCYTES # BLD AUTO: 1.34 K/UL
LYMPHOCYTES NFR BLD AUTO: 30.6 %
MAN DIFF?: NORMAL
MCHC RBC-ENTMCNC: 31.6 PG
MCHC RBC-ENTMCNC: 32.8 G/DL
MCV RBC AUTO: 96.4 FL
MICROSCOPIC-UA: NORMAL
MONOCYTES # BLD AUTO: 0.44 K/UL
MONOCYTES NFR BLD AUTO: 10 %
NEUTROPHILS # BLD AUTO: 2.36 K/UL
NEUTROPHILS NFR BLD AUTO: 54 %
NITRITE URINE: NEGATIVE
NONHDLC SERPL-MCNC: 149 MG/DL
PH URINE: 5.5
PLATELET # BLD AUTO: 204 K/UL
POTASSIUM SERPL-SCNC: 4.2 MMOL/L
PROT SERPL-MCNC: 7.1 G/DL
PROTEIN URINE: 30 MG/DL
RBC # BLD: 4.14 M/UL
RBC # FLD: 13.5 %
RED BLOOD CELLS URINE: 2 /HPF
REVIEW: NORMAL
SODIUM SERPL-SCNC: 140 MMOL/L
SPECIFIC GRAVITY URINE: 1.03
TRIGL SERPL-MCNC: 91 MG/DL
UROBILINOGEN URINE: 0.2 MG/DL
WBC # FLD AUTO: 4.38 K/UL
WHITE BLOOD CELLS URINE: 2 /HPF

## 2025-01-13 ENCOUNTER — NON-APPOINTMENT (OUTPATIENT)
Age: 65
End: 2025-01-13

## 2025-04-24 LAB
APPEARANCE: CLEAR
BACTERIA: NEGATIVE /HPF
BILIRUBIN URINE: NEGATIVE
BLOOD URINE: NEGATIVE
CAST: 0 /LPF
COLOR: YELLOW
EPITHELIAL CELLS: 0 /HPF
GLUCOSE QUALITATIVE U: NEGATIVE MG/DL
KETONES URINE: NEGATIVE MG/DL
LEUKOCYTE ESTERASE URINE: ABNORMAL
MICROSCOPIC-UA: NORMAL
NITRITE URINE: NEGATIVE
PH URINE: 6
PROTEIN URINE: NEGATIVE MG/DL
RED BLOOD CELLS URINE: 1 /HPF
SPECIFIC GRAVITY URINE: 1.02
UROBILINOGEN URINE: 0.2 MG/DL
WHITE BLOOD CELLS URINE: 0 /HPF

## 2025-05-05 ENCOUNTER — APPOINTMENT (OUTPATIENT)
Dept: DERMATOLOGY | Facility: CLINIC | Age: 65
End: 2025-05-05
Payer: MEDICARE

## 2025-05-05 PROCEDURE — 17003 DESTRUCT PREMALG LES 2-14: CPT

## 2025-05-05 PROCEDURE — 99213 OFFICE O/P EST LOW 20 MIN: CPT | Mod: 25

## 2025-05-05 PROCEDURE — 17000 DESTRUCT PREMALG LESION: CPT

## 2025-05-07 ENCOUNTER — OFFICE (OUTPATIENT)
Dept: URBAN - METROPOLITAN AREA CLINIC 115 | Facility: CLINIC | Age: 65
Setting detail: OPHTHALMOLOGY
End: 2025-05-07
Payer: MEDICARE

## 2025-05-07 ENCOUNTER — NON-APPOINTMENT (OUTPATIENT)
Age: 65
End: 2025-05-07

## 2025-05-07 DIAGNOSIS — H02.834: ICD-10-CM

## 2025-05-07 DIAGNOSIS — H04.121: ICD-10-CM

## 2025-05-07 DIAGNOSIS — H35.033: ICD-10-CM

## 2025-05-07 DIAGNOSIS — H01.002: ICD-10-CM

## 2025-05-07 DIAGNOSIS — H01.005: ICD-10-CM

## 2025-05-07 DIAGNOSIS — H04.122: ICD-10-CM

## 2025-05-07 DIAGNOSIS — H43.813: ICD-10-CM

## 2025-05-07 DIAGNOSIS — H04.123: ICD-10-CM

## 2025-05-07 DIAGNOSIS — H25.13: ICD-10-CM

## 2025-05-07 PROCEDURE — 83861 MICROFLUID ANALY TEARS: CPT | Performed by: OPHTHALMOLOGY

## 2025-05-07 PROCEDURE — 92014 COMPRE OPH EXAM EST PT 1/>: CPT | Performed by: OPHTHALMOLOGY

## 2025-05-07 PROCEDURE — 92250 FUNDUS PHOTOGRAPHY W/I&R: CPT | Performed by: OPHTHALMOLOGY

## 2025-05-07 ASSESSMENT — REFRACTION_MANIFEST
OS_SPHERE: -0.25
OD_AXIS: 105
OS_CYLINDER: -2.25
OD_VA1: 20/20
OS_CYLINDER: -2.50
OD_SPHERE: -0.50
OD_CYLINDER: -0.50
OD_SPHERE: -0.25
OD_VA1: 20/20
OD_AXIS: 105
OD_VA1: 20/20
OS_SPHERE: -0.50
OS_SPHERE: -0.25
OS_CYLINDER: -2.25
OS_VA1: 20/25
OD_CYLINDER: -0.50
OD_ADD: +2.50
OS_AXIS: 075
OS_AXIS: 070
OD_AXIS: 105
OS_VA1: 20/25
OS_CYLINDER: -2.50
OS_ADD: +2.50
OS_AXIS: 070
OS_ADD: +2.50
OU_VA: 20/25
OD_ADD: +2.50
OU_VA: 20/20
OS_VA1: 20/20-1
OD_VA1: 20/20
OD_ADD: +2.50
OD_ADD: +2.50
OS_AXIS: 075
OD_CYLINDER: -0.50
OD_AXIS: 095
OS_VA1: 20/20
OD_SPHERE: -0.25
OS_ADD: +2.50
OS_ADD: +2.50
OD_SPHERE: -0.50
OD_CYLINDER: -0.50
OU_VA: 20/20-1
OS_SPHERE: -0.50

## 2025-05-07 ASSESSMENT — REFRACTION_CURRENTRX
OD_CYLINDER: -0.50
OS_SPHERE: -0.50
OS_OVR_VA: 20/
OD_SPHERE: -0.50
OS_AXIS: 070
OS_VPRISM_DIRECTION: PROGS
OS_CYLINDER: -2.50
OD_ADD: +2.50
OS_ADD: +2.50
OD_OVR_VA: 20/
OS_VPRISM_DIRECTION: PROGS
OD_CYLINDER: -0.50
OD_AXIS: 090
OS_AXIS: 069
OS_CYLINDER: -2.75
OD_AXIS: 105
OD_SPHERE: -0.50
OS_OVR_VA: 20/
OD_VPRISM_DIRECTION: BF
OS_SPHERE: -0.50
OD_VPRISM_DIRECTION: BF
OS_VPRISM_DIRECTION: BF
OD_SPHERE: -0.75
OD_AXIS: 095
OD_CYLINDER: -0.50
OD_VPRISM_DIRECTION: PROGS
OD_ADD: +2.50
OS_ADD: +2.50
OS_CYLINDER: -2.50
OD_OVR_VA: 20/
OS_SPHERE: -0.50
OS_AXIS: 076
OS_AXIS: 072
OS_CYLINDER: -2.50
OS_ADD: +2.25
OS_CYLINDER: -2.50
OD_ADD: +2.50
OD_AXIS: 100
OS_AXIS: 067
OD_CYLINDER: -0.50
OS_VPRISM_DIRECTION: BF
OD_ADD: +2.25
OS_ADD: +2.50
OD_SPHERE: -0.25
OS_ADD: +2.50
OD_OVR_VA: 20/
OD_SPHERE: -0.50
OD_AXIS: 086
OS_SPHERE: -0.50
OS_OVR_VA: 20/
OS_SPHERE: -0.75
OD_CYLINDER: -0.50
OD_ADD: +2.50
OD_VPRISM_DIRECTION: PROGS

## 2025-05-07 ASSESSMENT — REFRACTION_AUTOREFRACTION
OS_AXIS: 071
OD_CYLINDER: -0.75
OD_SPHERE: -0.25
OS_SPHERE: 0.00
OS_CYLINDER: -2.50
OD_AXIS: 106

## 2025-05-07 ASSESSMENT — KERATOMETRY
OS_K1POWER_DIOPTERS: 43.50
OD_AXISANGLE_DEGREES: 090
OS_K2POWER_DIOPTERS: 45.00
OD_K2POWER_DIOPTERS: 44.25
OS_AXISANGLE_DEGREES: 159
OD_K1POWER_DIOPTERS: 44.25

## 2025-05-07 ASSESSMENT — TONOMETRY
OD_IOP_MMHG: 15
OS_IOP_MMHG: 18

## 2025-05-07 ASSESSMENT — LID EXAM ASSESSMENTS
OD_BLEPHARITIS: RLL 1+
OS_BLEPHARITIS: LLL 1+

## 2025-05-07 ASSESSMENT — SUPERFICIAL PUNCTATE KERATITIS (SPK)
OD_SPK: T
OS_SPK: T

## 2025-05-07 ASSESSMENT — VISUAL ACUITY
OD_BCVA: 20/40
OS_BCVA: 20/25

## 2025-05-07 ASSESSMENT — CONFRONTATIONAL VISUAL FIELD TEST (CVF)
OD_FINDINGS: FULL
OS_FINDINGS: FULL

## 2025-05-07 ASSESSMENT — LID POSITION - DERMATOCHALASIS: OS_DERMATOCHALASIS: LUL T 1+

## 2025-05-13 NOTE — ASU PATIENT PROFILE, ADULT - TOBACCO USE
Called patient between 75 and 105 days after discharge. Assessed Modified [place] Scale to be 0.  
Never smoker

## 2025-06-09 ENCOUNTER — NON-APPOINTMENT (OUTPATIENT)
Age: 65
End: 2025-06-09

## (undated) DEVICE — KIT DEFENDO 4 OLY 4 PC